# Patient Record
Sex: MALE | Race: WHITE | NOT HISPANIC OR LATINO | Employment: OTHER | URBAN - NONMETROPOLITAN AREA
[De-identification: names, ages, dates, MRNs, and addresses within clinical notes are randomized per-mention and may not be internally consistent; named-entity substitution may affect disease eponyms.]

---

## 2017-02-09 RX ORDER — AMITRIPTYLINE HYDROCHLORIDE 25 MG/1
TABLET, FILM COATED ORAL
Qty: 90 TABLET | Refills: 3 | Status: SHIPPED | OUTPATIENT
Start: 2017-02-09 | End: 2017-04-25

## 2017-04-25 ENCOUNTER — OFFICE VISIT (OUTPATIENT)
Dept: NEUROLOGY | Facility: CLINIC | Age: 42
End: 2017-04-25

## 2017-04-25 VITALS
SYSTOLIC BLOOD PRESSURE: 128 MMHG | WEIGHT: 211 LBS | HEIGHT: 67 IN | BODY MASS INDEX: 33.12 KG/M2 | DIASTOLIC BLOOD PRESSURE: 70 MMHG | OXYGEN SATURATION: 97 % | HEART RATE: 78 BPM

## 2017-04-25 DIAGNOSIS — M54.81 OCCIPITAL NEURALGIA OF LEFT SIDE: ICD-10-CM

## 2017-04-25 DIAGNOSIS — G43.011 INTRACTABLE MIGRAINE WITHOUT AURA AND WITH STATUS MIGRAINOSUS: Primary | ICD-10-CM

## 2017-04-25 PROCEDURE — 99214 OFFICE O/P EST MOD 30 MIN: CPT | Performed by: PSYCHIATRY & NEUROLOGY

## 2017-04-25 RX ORDER — AMITRIPTYLINE HYDROCHLORIDE 100 MG/1
TABLET, FILM COATED ORAL
Qty: 30 TABLET | Refills: 3 | Status: SHIPPED | OUTPATIENT
Start: 2017-04-25 | End: 2017-08-25 | Stop reason: SDUPTHER

## 2017-04-25 RX ORDER — CHLORTHALIDONE 25 MG/1
TABLET ORAL
Refills: 2 | COMMUNITY
Start: 2017-04-03 | End: 2018-05-25 | Stop reason: ALTCHOICE

## 2017-04-25 NOTE — PROGRESS NOTES
"[facility] NEUROLOGY [place] PROGRESS NOTE  History of Present Illness     Date: 4/25/2017    Patient Identification  Crow Watson is a 41 y.o. male.    Patient information was obtained from patient.  History/Exam limitations: none.    Original consultation requested by: Mario Taylor M.D.      Chief Complaint   Migraine (Pt here to follow up on trigger injections, Pt states that the last injections didn't \"absolutely nothing\")      History of Present Illness   Patient is a pleasant 41-year-old gentleman referred to the neurology clinic for evaluation of neck pain and headache.  In today visits patient reports to have recurrent headache.    Patient described the headache is involving the occipital lobe region.  Radiating from his neck following greater occipital nerve.     PMH:   Past Medical History:   Diagnosis Date   • Anxiety    • Atrophy, cortical     white matter disease   • Gout    • Herniated disc, cervical    • Hyperlipidemia    • Hypertension    • Hyperthyroidism    • Lumbar herniated disc    • Memory loss    • Migraine    • Neuropathy    • Tremor        Past Surgical History:   Past Surgical History:   Procedure Laterality Date   • COLONOSCOPY  2002   • SINUS SURGERY  2014   • VASECTOMY      2000       Family Hisotry: No family history on file.    Social History:   Social History     Social History   • Marital status:      Spouse name: N/A   • Number of children: N/A   • Years of education: N/A     Occupational History   • Not on file.     Social History Main Topics   • Smoking status: Not on file   • Smokeless tobacco: Not on file   • Alcohol use Not on file   • Drug use: Not on file   • Sexual activity: Not on file     Other Topics Concern   • Not on file     Social History Narrative   • No narrative on file       Medications:   Current Outpatient Prescriptions   Medication Sig Dispense Refill   • allopurinol (ZYLOPRIM) 300 MG tablet bid     • chlorthalidone (HYGROTON) 25 MG tablet Take one daily "  2   • COLCRYS 0.6 MG tablet Daily.  2   • gabapentin (NEURONTIN) 600 MG tablet Tid     • levothyroxine (SYNTHROID, LEVOTHROID) 50 MCG tablet      • lisinopril (PRINIVIL,ZESTRIL) 20 MG tablet bid     • pantoprazole (PROTONIX) 40 MG EC tablet      • propranolol (INDERAL) 20 MG tablet   2   • QUEtiapine (SEROquel) 200 MG tablet      • sertraline (ZOLOFT) 100 MG tablet tid     • traZODone (DESYREL) 100 MG tablet 2 at bedtime     • amitriptyline (ELAVIL) 100 MG tablet One pill at 5 PM at supper time 30 tablet 3     No current facility-administered medications for this visit.        Allergy:   Allergies   Allergen Reactions   • Morphine And Related Other (See Comments)     Migraine, hypertension   • Ultram [Tramadol Hcl] Nausea And Vomiting   • Zithromax [Azithromycin] Nausea And Vomiting       Review of Systems:  Review of Systems   Constitutional: Negative for chills and fever.   HENT: Negative for congestion, ear pain, hearing loss, rhinorrhea and sore throat.    Eyes: Negative for pain, discharge and redness.   Respiratory: Negative for cough, shortness of breath, wheezing and stridor.    Cardiovascular: Negative for chest pain, palpitations and leg swelling.   Gastrointestinal: Negative for abdominal pain, constipation, nausea and vomiting.   Endocrine: Negative for cold intolerance, heat intolerance and polyphagia.   Genitourinary: Negative for dysuria, flank pain, frequency and urgency.   Musculoskeletal: Positive for neck pain and neck stiffness. Negative for joint swelling and myalgias.   Skin: Negative for pallor, rash and wound.   Allergic/Immunologic: Negative for environmental allergies.   Neurological: Positive for headaches. Negative for dizziness, tremors, seizures, syncope, facial asymmetry, speech difficulty, weakness, light-headedness and numbness.   Hematological: Negative for adenopathy.   Psychiatric/Behavioral: Negative for confusion and hallucinations. The patient is not nervous/anxious.   "      Physical Exam     Vitals:    04/25/17 1139   BP: 128/70   Pulse: 78   SpO2: 97%   Weight: 211 lb (95.7 kg)   Height: 67\" (170.2 cm)     GENERAL: Patient is pleasant, cooperative, appears to be stated age.  Body habitus is endomorphic.  SKIN AND EXTREMITIES:  No skin rashes or lesions are noted.  No cyanosis, clubbing or edema of the extremities.    HEAD:  Head is normocephalic and atraumatic.    NECK: Neck are non-tender without thyromegaly or adenopathy.  Carotic upstrokes are 1+/4.  No cranial or cervical bruits.  The neck is supple with a full range of motion.   ENT: palate elevate symmetrically, no evidence of high arch palate, tongue midline erythema in posterior pharynx, Mallampati Classification Class III   CARDIOVASCULAR:  Regular rate and rhythm with normal S1 and S2 without rub or gallop.  RESPIRATORY:  Clear to auscultation without wheezes or crackle   ABDOMEN:  Soft and non-tender, positive bowel sound without hepatosplenomegaly  BACK:  Back is straight without midline defect.    PSYCH:  Higher cortical function/mental status:  The patient is alert.  She is oriented x3 to time, place and person.  Recent and the remote memory appear normal.  The patient has a good fund of knowledge.  There is no visual or auditory hallucination or suicidal or homicidal ideation.  SPEECH:There is no gross evidence of aphasia, dysarthria or agnosia.      CRANIAL NERVES:  Pupils are 4mm, equal round reactive to light, reacting briskly to 2mm without afferent pupillary defect.  Visual fields are intact to confrontation testing.  Fundoscopic examination reveals sharp disk margins with normal vasculature.  No papilledema, hemorrhages or exudates.  Extraocular movements are full and smooth with normal pursuits and saccades.  No nystagmus noted.  The face is symmetric. palate elevate symmetrically, Tongue midline, positive gag reflex. The remainder of the cranial nerves are intact and symmetrical.    MOTOR: Strength is 5/5 " throughout with normal tone and bulk with the following exceptions, 4/5 intrinsic muscles of the hands and feet.  No involuntary movements noted.    Deep Tendon Reflexes: are 2/4 and symmetrical in the upper extremities, 2/4 and symmetrical at the knees and 1/4 and symmetrical at the Achilles tendon.  Plantar responses were down-going bilaterally.    SENSATION:  Intact to pinprick, light touch, vibration and proprioception.  Coordination:  The patient normally performs finger-nose-finger, heel-to-knee-to-shin and rapid alternating movements in symmetrical fashion.    COORDINATION AND GAIT:  The patient walks with a narrow-based gait.  Patient is able to heel-toe and tandem walk forward and backwards without difficulty.  Romberg and monopedal  Romberg are negative.    MUSCULOSKELETAL: Range of motion normal, no clubbing, cyanosis, or edema.  No joint swelling.            Studies: I have personally reviewed the following and discussed with the patient.  No results found for this or any previous visit.    Review of Imaging: I have personally reviewed the following images and discussed with the patient.  No results found.      Records Reviewed: I have personally reviewed his previous medical record.    Crow was seen today for migraine.    Diagnoses and all orders for this visit:    Intractable migraine without aura and with status migrainosus  -     Inject Trigger Points, > 3    Occipital neuralgia of left side    Other orders  -     amitriptyline (ELAVIL) 100 MG tablet; One pill at 5 PM at supper time        Treatments:  1. Would like to schedule patient to have a trigger point injection  2. I will also schedule this patient to have occipital nerve block  3. With the increased dissipation on amitriptyline from 75 mg to 100 mg at suppertime.      This Document is signed by Ashok Higginbotham MD, FAAN, FAASM   April 25, 20177:01 PM

## 2017-05-08 RX ORDER — AMITRIPTYLINE HYDROCHLORIDE 25 MG/1
TABLET, FILM COATED ORAL
Qty: 90 TABLET | Refills: 3 | OUTPATIENT
Start: 2017-05-08

## 2017-06-19 ENCOUNTER — PROCEDURE VISIT (OUTPATIENT)
Dept: NEUROLOGY | Facility: CLINIC | Age: 42
End: 2017-06-19

## 2017-06-19 VITALS
WEIGHT: 222.4 LBS | HEART RATE: 82 BPM | OXYGEN SATURATION: 98 % | HEIGHT: 67 IN | SYSTOLIC BLOOD PRESSURE: 128 MMHG | DIASTOLIC BLOOD PRESSURE: 86 MMHG | BODY MASS INDEX: 34.91 KG/M2

## 2017-06-19 DIAGNOSIS — M54.2 NECK PAIN: ICD-10-CM

## 2017-06-19 DIAGNOSIS — G44.209 TENSION HEADACHE: ICD-10-CM

## 2017-06-19 DIAGNOSIS — M79.18 MYOFASCIAL MUSCLE PAIN: Primary | ICD-10-CM

## 2017-06-19 PROCEDURE — 20553 NJX 1/MLT TRIGGER POINTS 3/>: CPT | Performed by: PSYCHIATRY & NEUROLOGY

## 2017-06-19 RX ORDER — METHYLPREDNISOLONE ACETATE 40 MG/ML
200 INJECTION, SUSPENSION INTRA-ARTICULAR; INTRALESIONAL; INTRAMUSCULAR; SOFT TISSUE ONCE
Status: COMPLETED | OUTPATIENT
Start: 2017-06-20 | End: 2017-06-19

## 2017-06-19 RX ORDER — AMLODIPINE BESYLATE 10 MG/1
TABLET ORAL
Refills: 3 | COMMUNITY
Start: 2017-05-18 | End: 2018-05-25 | Stop reason: DRUGHIGH

## 2017-06-19 RX ORDER — BUPIVACAINE HYDROCHLORIDE 5 MG/ML
5 INJECTION, SOLUTION EPIDURAL; INTRACAUDAL ONCE
Status: SHIPPED | OUTPATIENT
Start: 2017-06-20

## 2017-06-19 RX ADMIN — METHYLPREDNISOLONE ACETATE 200 MG: 40 INJECTION, SUSPENSION INTRA-ARTICULAR; INTRALESIONAL; INTRAMUSCULAR; SOFT TISSUE at 23:27

## 2017-06-20 NOTE — PROGRESS NOTES
Procedure note    Procedure: Trigger point injection    Indication: Patient is delightful 41-year-old gentleman with persistent migraine headache 22 day out of 30 days despite taking amitriptyline 100 mg 1 pill a day along with Neurontin 600 mg 3 times a day and propranolol 20 mg.    Procedure  Patient is suffering from headache and myofacial pain syndrome. Risks and benefits of the Trigger point injection procedure have been explained to the patient.  Patient has no contraindications such as bleed diathesis, recent acute trauma at the muscle sites, anesthetic allergy or anticoagulation.  Mechanism of trigger point injection has been explained to patient.     Procedure Note:  1.         Patient was positioned comfortably  2.         Before injections are started, 10 Trigger Points sites are identified throughout the bilateral upper trapezius muscle, levator scapulae, and erector spinae muscles.    3.         Overlying skin area was cleaned with Alcohol swab                                                                                                              4.         Before injection, trigger points sites were palpated for local twitch and referred pain to confirms placement                         5.         Local anesthetic was mixed with Depo-Medrol (5 cc of Marcaine 0.5% mixed with 5 cc of Depo-Medrol at 40 mg/ml - for a total of 10 cc)  6.         30 gauge ½ inch needle was utilized to ensure patient comfort          7.         I started with the most tender spot in above mentioned Trigger Points   1.   Localize most tender spot within taut muscle-fibers  2.   Fix tender spot between fingers (1-2 cm in size)   1.   Prevents from rolling away from needle  2.   Controls subcutaneous bleeding  3.   Before injection, patient was instructed of possible pain on injection  4.   Direct needle at 30 degree angle off skin   8.         Insert needle into skin 1-2 cm from Trigger Point   9.         Advance needle  into Trigger Point   10.       Use 1cc anesthetic at each Trigger Points identified in step #2  11.       Redirect needle and reinject                                                                                              1.   Withdraw needle to subcutaneous tissue  2.   Redirect needle into adjacent tender areas  3.   Repeat until local twitch or tautness resolves  12.   After each of the 10 injections I held direct pressure at injection site for 2 minutes to prevents hematoma formation  13.   The same procedure was repeated for other Tender Points located in the upper trapezius muscle, levator scapulae and erector spinae bilaterally  14.   Patient was instructed to gently stretch injected areas to full active range of motion in all directions and was instructed to repeat range of motion three times after injection  15.   Post-Procedure patient was instructed to avoid over-using injected area for 3-4 days   1.   Maintain active range of motion of injected muscle  2.   Patient applies ice to injected areas for a few hours  3.   Anticipate post-injection soreness for 3-4 days  There was no evidence of complications from injection was noted such as local Skin Infection  at injection site or local hematoma at injection site      Marcaine lots #[id number]  Expiration date January 1, 2019    Depo-Medrol lots numberS [id number]  Expiration date November 2019  Ashok Higginbotham MD, FAAN  06/19/2017

## 2017-08-25 RX ORDER — AMITRIPTYLINE HYDROCHLORIDE 100 MG/1
TABLET, FILM COATED ORAL
Qty: 30 TABLET | Refills: 0 | Status: SHIPPED | OUTPATIENT
Start: 2017-08-25 | End: 2017-09-25 | Stop reason: SDUPTHER

## 2017-09-25 ENCOUNTER — PROCEDURE VISIT (OUTPATIENT)
Dept: NEUROLOGY | Facility: CLINIC | Age: 42
End: 2017-09-25

## 2017-09-25 VITALS
HEIGHT: 67 IN | WEIGHT: 214 LBS | HEART RATE: 83 BPM | SYSTOLIC BLOOD PRESSURE: 142 MMHG | BODY MASS INDEX: 33.59 KG/M2 | DIASTOLIC BLOOD PRESSURE: 88 MMHG | OXYGEN SATURATION: 97 %

## 2017-09-25 DIAGNOSIS — M54.2 NECK PAIN: Primary | ICD-10-CM

## 2017-09-25 DIAGNOSIS — M79.18 MYOFASCIAL MUSCLE PAIN: ICD-10-CM

## 2017-09-25 DIAGNOSIS — G44.209 TENSION HEADACHE: ICD-10-CM

## 2017-09-25 PROCEDURE — 20553 NJX 1/MLT TRIGGER POINTS 3/>: CPT | Performed by: PSYCHIATRY & NEUROLOGY

## 2017-09-25 RX ORDER — PROMETHAZINE HYDROCHLORIDE 25 MG/1
TABLET ORAL
COMMUNITY
Start: 2017-08-31

## 2017-09-25 RX ORDER — METHYLPREDNISOLONE ACETATE 40 MG/ML
200 INJECTION, SUSPENSION INTRA-ARTICULAR; INTRALESIONAL; INTRAMUSCULAR; SOFT TISSUE ONCE
Status: COMPLETED | OUTPATIENT
Start: 2017-09-25 | End: 2017-09-28

## 2017-09-25 RX ORDER — AMITRIPTYLINE HYDROCHLORIDE 100 MG/1
100 TABLET, FILM COATED ORAL NIGHTLY
Qty: 30 TABLET | Refills: 3 | Status: SHIPPED | OUTPATIENT
Start: 2017-09-25 | End: 2018-05-25 | Stop reason: ALTCHOICE

## 2017-09-25 RX ORDER — BUPIVACAINE HYDROCHLORIDE 7.5 MG/ML
5 INJECTION, SOLUTION EPIDURAL; RETROBULBAR ONCE
Status: SHIPPED | OUTPATIENT
Start: 2017-09-25

## 2017-09-28 RX ADMIN — METHYLPREDNISOLONE ACETATE 200 MG: 40 INJECTION, SUSPENSION INTRA-ARTICULAR; INTRALESIONAL; INTRAMUSCULAR; SOFT TISSUE at 23:08

## 2017-09-29 NOTE — PROGRESS NOTES
Procedure note    Procedure: Trigger point injection    Indication: Persistent neck pain and headache and myofascial pain despite Elavil and Neurontin, Inderal.    Procedure note:  Patient is suffering from headache and myofacial pain syndrome. Risks and benefits of the Trigger point injection procedure have been explained to the patient.  Patient has no contraindications such as bleed diathesis, recent acute trauma at the muscle sites, anesthetic allergy or anticoagulation.  Mechanism of trigger point injection has been explained to patient.     Procedure Note:  1.         Patient was positioned comfortably  2.         Before injections are started, 10 Trigger Points sites are identified throughout the bilateral upper trapezius muscle, levator scapulae, and erector spinae muscles.    3.         Overlying skin area was cleaned with Alcohol swab                                                                                                              4.         Before injection, trigger points sites were palpated for local twitch and referred pain to confirms placement                         5.         Local anesthetic was mixed with Depo-Medrol (5 cc of Marcaine 0.5% mixed with 5 cc of Depo-Medrol at 40 mg/ml - for a total of 10 cc)  6.         30 gauge ½ inch needle was utilized to ensure patient comfort          7.         I started with the most tender spot in above mentioned Trigger Points   1.   Localize most tender spot within taut muscle-fibers  2.   Fix tender spot between fingers (1-2 cm in size)   1.   Prevents from rolling away from needle  2.   Controls subcutaneous bleeding  3.   Before injection, patient was instructed of possible pain on injection  4.   Direct needle at 30 degree angle off skin   8.         Insert needle into skin 1-2 cm from Trigger Point   9.         Advance needle into Trigger Point   10.       Use 1cc anesthetic at each Trigger Points identified in step #2  11.       Redirect  needle and reinject                                                                                              1.   Withdraw needle to subcutaneous tissue  2.   Redirect needle into adjacent tender areas  3.   Repeat until local twitch or tautness resolves  12.   After each of the 10 injections I held direct pressure at injection site for 2 minutes to prevents hematoma formation  13.   The same procedure was repeated for other Tender Points located in the upper trapezius muscle, levator scapulae and erector spinae bilaterally  14.   Patient was instructed to gently stretch injected areas to full active range of motion in all directions and was instructed to repeat range of motion three times after injection  15.   Post-Procedure patient was instructed to avoid over-using injected area for 3-4 days   1.   Maintain active range of motion of injected muscle  2.   Patient applies ice to injected areas for a few hours  3.   Anticipate post-injection soreness for 3-4 days  There was no evidence of complications from injection was noted such as local Skin Infection  at injection site or local hematoma at injection site      Ashok Higginbotham MD, FAAN  09/25/2017

## 2018-05-25 ENCOUNTER — OFFICE VISIT (OUTPATIENT)
Dept: NEUROLOGY | Facility: CLINIC | Age: 43
End: 2018-05-25

## 2018-05-25 VITALS
HEIGHT: 67 IN | DIASTOLIC BLOOD PRESSURE: 82 MMHG | OXYGEN SATURATION: 99 % | SYSTOLIC BLOOD PRESSURE: 128 MMHG | HEART RATE: 77 BPM

## 2018-05-25 DIAGNOSIS — G43.711 INTRACTABLE CHRONIC MIGRAINE WITHOUT AURA AND WITH STATUS MIGRAINOSUS: Primary | ICD-10-CM

## 2018-05-25 PROCEDURE — 99214 OFFICE O/P EST MOD 30 MIN: CPT | Performed by: PSYCHIATRY & NEUROLOGY

## 2018-05-25 RX ORDER — AMLODIPINE BESYLATE 5 MG/1
1 TABLET ORAL DAILY
Refills: 5 | COMMUNITY
Start: 2018-04-10

## 2018-05-25 RX ORDER — HYDROCHLOROTHIAZIDE 12.5 MG/1
1 TABLET ORAL DAILY
Refills: 2 | COMMUNITY
Start: 2018-04-10

## 2018-05-25 RX ORDER — AMITRIPTYLINE HYDROCHLORIDE 50 MG/1
1 TABLET, FILM COATED ORAL DAILY
Refills: 1 | COMMUNITY
Start: 2018-04-10

## 2018-05-25 NOTE — PROGRESS NOTES
"[facility] NEUROLOGY [place] PROGRESS NOTE  History of Present Illness     Date: 5/25/2018    Patient Identification  Crow Watson is a 43 y.o. male.    Patient information was obtained from patient.  History/Exam limitations: none.    Original consultation requested by: Mario Maria MD      Chief Complaint   Migraine (Pt in office today to discuss migraines, states sx are \"same as before\") and Med Refill      History of Present Illness   Patient is a pleasant 43-year-old referred to [facility] neurology [place] for evaluation of migraine headache.  In today visit patient reported that he continued to experience excruciating migraine headache despite trigger point injection and conservative medical management.  Patient has tried numerous prophylactic treatment with Elavil and Inderal and Phenergan without any significant improvement.  Patient still continued to have headache 30 out of 30 days.  And his headache last at least 4 hours at the time.  We have also discussed other treatment option including Botox injection and CGRP inhibitor.    PMH:   Past Medical History:   Diagnosis Date   • Anxiety    • Atrophy, cortical     white matter disease   • Gout    • Herniated disc, cervical    • Hyperlipidemia    • Hypertension    • Hyperthyroidism    • Lumbar herniated disc    • Memory loss    • Migraine    • Neuropathy    • Tremor        Past Surgical History:   Past Surgical History:   Procedure Laterality Date   • COLONOSCOPY  2002   • SINUS SURGERY  2014   • VASECTOMY      2000       Family Hisotry: No family history on file.    Social History:   Social History     Social History   • Marital status:      Spouse name: N/A   • Number of children: N/A   • Years of education: N/A     Occupational History   • Not on file.     Social History Main Topics   • Smoking status: Not on file   • Smokeless tobacco: Not on file   • Alcohol use Not on file   • Drug use: Unknown   • Sexual activity: Not on file "     Other Topics Concern   • Not on file     Social History Narrative   • No narrative on file       Medications:   Current Outpatient Prescriptions   Medication Sig Dispense Refill   • allopurinol (ZYLOPRIM) 300 MG tablet bid     • amitriptyline (ELAVIL) 50 MG tablet 1 tablet Daily.  1   • amLODIPine (NORVASC) 5 MG tablet 1 tablet Daily.  5   • COLCRYS 0.6 MG tablet Daily.  2   • hydrochlorothiazide (HYDRODIURIL) 12.5 MG tablet 1 tablet Daily.  2   • levothyroxine (SYNTHROID, LEVOTHROID) 50 MCG tablet      • lisinopril (PRINIVIL,ZESTRIL) 20 MG tablet bid     • pantoprazole (PROTONIX) 40 MG EC tablet      • promethazine (PHENERGAN) 25 MG tablet      • propranolol (INDERAL) 20 MG tablet   2   • QUEtiapine (SEROquel) 200 MG tablet      • sertraline (ZOLOFT) 100 MG tablet tid     • traZODone (DESYREL) 100 MG tablet 2 at bedtime       Current Facility-Administered Medications   Medication Dose Route Frequency Provider Last Rate Last Dose   • bupivacaine (PF) (MARCAINE) 0.5 % injection 5 mL  5 mL Injection Once Ahsok Higginbotham MD, FAAN       • bupivacaine PF (MARCAINE) 0.75 % injection 37.5 mg  5 mL Injection Once Ashok Higginbotham MD, FAAN       • OnabotulinumtoxinA 200 Units  200 Units Intramuscular Once Ashok Higginbotham MD, FAAN           Allergy:   Allergies   Allergen Reactions   • Morphine And Related Other (See Comments)     Migraine, hypertension   • Ultram [Tramadol Hcl] Nausea And Vomiting   • Zithromax [Azithromycin] Nausea And Vomiting       Review of Systems:  Review of Systems   Constitutional: Negative for chills and fever.   HENT: Negative for congestion, ear pain, hearing loss, rhinorrhea and sore throat.    Eyes: Negative for pain, discharge and redness.   Respiratory: Negative for cough, shortness of breath, wheezing and stridor.    Cardiovascular: Negative for chest pain, palpitations and leg swelling.   Gastrointestinal: Negative for abdominal pain, constipation, nausea and vomiting.   Endocrine: Negative for  "cold intolerance, heat intolerance and polyphagia.   Genitourinary: Negative for dysuria, flank pain, frequency and urgency.   Musculoskeletal: Negative for joint swelling, myalgias, neck pain and neck stiffness.   Skin: Negative for pallor, rash and wound.   Allergic/Immunologic: Negative for environmental allergies.   Neurological: Positive for headaches. Negative for dizziness, tremors, seizures, syncope, facial asymmetry, speech difficulty, weakness, light-headedness and numbness.   Hematological: Negative for adenopathy.   Psychiatric/Behavioral: Positive for sleep disturbance. Negative for confusion and hallucinations. The patient is not nervous/anxious.        Physical Exam     Vitals:    05/25/18 1506   BP: 128/82   Pulse: 77   SpO2: 99%   Height: 170.2 cm (67\")     GENERAL: Patient is pleasant, cooperative, appears to be stated age.  Body habitus is endomorphic.  SKIN AND EXTREMITIES:  No skin rashes or lesions are noted.  No cyanosis, clubbing or edema of the extremities.    HEAD:  Head is normocephalic and atraumatic.    NECK: Neck are non-tender without thyromegaly or adenopathy.  Carotic upstrokes are 1+/4.  No cranial or cervical bruits.  The neck is supple with a full range of motion.   ENT: palate elevate symmetrically, no evidence of high arch palate, tongue midline erythema in posterior pharynx, Mallampati Classification Class III   CARDIOVASCULAR:  Regular rate and rhythm with normal S1 and S2 without rub or gallop.  RESPIRATORY:  Clear to auscultation without wheezes or crackle   ABDOMEN:  Soft and non-tender, positive bowel sound without hepatosplenomegaly  BACK:  Back is straight without midline defect.    PSYCH:  Higher cortical function/mental status:  The patient is alert.  She is oriented x3 to time, place and person.  Recent and the remote memory appear normal.  The patient has a good fund of knowledge.  There is no visual or auditory hallucination or suicidal or homicidal " ideation.  SPEECH:There is no gross evidence of aphasia, dysarthria or agnosia.      CRANIAL NERVES:  Pupils are 4mm, equal round reactive to light, reacting briskly to 2mm without afferent pupillary defect.  Visual fields are intact to confrontation testing.  Fundoscopic examination reveals sharp disk margins with normal vasculature.  No papilledema, hemorrhages or exudates.  Extraocular movements are full and smooth with normal pursuits and saccades.  No nystagmus noted.  The face is symmetric. palate elevate symmetrically, Tongue midline, positive gag reflex. The remainder of the cranial nerves are intact and symmetrical.    MOTOR: Strength is 5/5 throughout with normal tone and bulk with the following exceptions, 4/5 intrinsic muscles of the hands and feet.  No involuntary movements noted.    Deep Tendon Reflexes: are 2/4 and symmetrical in the upper extremities, 2/4 and symmetrical at the knees and 1/4 and symmetrical at the Achilles tendon.  Plantar responses were down-going bilaterally.    SENSATION:  Intact to pinprick, light touch, vibration and proprioception.  Coordination:  The patient normally performs finger-nose-finger, heel-to-knee-to-shin and rapid alternating movements in symmetrical fashion.    COORDINATION AND GAIT:  The patient walks with a narrow-based gait.  Patient is able to heel-toe and tandem walk forward and backwards without difficulty.  Romberg and monopedal  Romberg are negative.    MUSCULOSKELETAL: Range of motion normal, no clubbing, cyanosis, or edema.  No joint swelling.            Records Reviewed: I have personally reviewed his previous medical record.    Crow was seen today for migraine and med refill.    Diagnoses and all orders for this visit:    Intractable chronic migraine without aura and with status migrainosus  -     OnabotulinumtoxinA 200 Units; Inject 200 Units into the shoulder, thigh, or buttocks 1 (One) Time.        Treatments:  1.  Since patient has failed  "conservative medical management and he has also failed trigger point injection patient continued to have headache 30 out of 30 days lasting for more than 4 hours at a time may consider Botox injection.  Discussion:  Migraine Headache  Migraine headaches are a major public health problem affecting more than 28 million persons in this country. Nearly 25 percent of women and 9 percent of men experience disabling migraines.    Migraine treatment depends on the duration and severity of pain, associated symptoms, degree of disability, and initial response to therapy.  A widely prescribed and effective class of medications for migraines is the 5-HT1 receptor-specific agonists (\"triptans\").  Contraindications to their use include ischemic vascular conditions, vasospastic coronary disease, uncontrolled hypertension, or other significant cardiovascular disease.  Following appropriate management of acute migraine, patients should be evaluated for initiation of preventive therapy. Factors that should prompt consideration of preventive therapy include the occurrence of two or more migraines per month with disability lasting three or more days per month; failure of, contraindication for, or adverse events from acute treatments; use of abortive medication more than twice per week; and uncommon migraine conditions (e.g., hemiplegic migraine, migraine with prolonged aura, migrainous infarction). Patient preference and cost also should be considered.  Evidence consistently supports the use of the beta blocker propranolol (Inderal) in migraine prophylaxis. Amitriptyline is a first-line agent for migraine prophylaxis and is the only antidepressant with consistent evidence supporting its effectiveness for this use. Divalproex (Depakote) and sodium valproate are well supported by evidence for use in migraine prevention.  Topamax has also been studies for migraine prophylaxis  in open label studies and double blind studies. Evidence does " not support the use of diltiazem (Cardizem) in migraine prevention, and the evidence for several other calcium channel blockers, such as nifedipine (Procardia), is poor and suggests only modest effect  Menstrual Migraine can present a challenge to clinician.  Estrogen withdrawal has been shown to precipitate migraine headaches, and a sustained elevated level of estrogen will postpone the migraine. Transdermal estrogen started just before menstruation can provide a sustained low level of estrogen, decreasing the degree of estrogen decline, and thus may prevent induction of migraines    This Document is signed by Ashok Higginbotham MD, FAAN, FAASM   May 25, 01046:36 PM

## 2024-02-21 RX ORDER — LABETALOL 200 MG/1
200 TABLET, FILM COATED ORAL 3 TIMES DAILY
COMMUNITY

## 2024-02-21 RX ORDER — NAPROXEN 500 MG/1
500 TABLET ORAL 2 TIMES DAILY WITH MEALS
COMMUNITY

## 2024-02-21 RX ORDER — VALSARTAN 80 MG/1
80 TABLET ORAL DAILY
COMMUNITY

## 2024-02-21 RX ORDER — MONTELUKAST SODIUM 10 MG/1
10 TABLET ORAL NIGHTLY
COMMUNITY

## 2024-02-21 RX ORDER — OMEPRAZOLE 20 MG/1
20 CAPSULE, DELAYED RELEASE ORAL DAILY
COMMUNITY

## 2024-02-27 ENCOUNTER — OFFICE VISIT (OUTPATIENT)
Dept: BEHAVIORAL HEALTH | Facility: CLINIC | Age: 49
End: 2024-02-27
Payer: MEDICARE

## 2024-02-27 ENCOUNTER — OFFICE VISIT (OUTPATIENT)
Dept: BARIATRICS/WEIGHT MGMT | Facility: CLINIC | Age: 49
End: 2024-02-27
Payer: MEDICARE

## 2024-02-27 VITALS
TEMPERATURE: 97.8 F | OXYGEN SATURATION: 98 % | SYSTOLIC BLOOD PRESSURE: 130 MMHG | BODY MASS INDEX: 35.16 KG/M2 | WEIGHT: 224 LBS | DIASTOLIC BLOOD PRESSURE: 74 MMHG | HEIGHT: 67 IN | HEART RATE: 83 BPM

## 2024-02-27 DIAGNOSIS — F41.8 ANXIETY ABOUT HEALTH: ICD-10-CM

## 2024-02-27 DIAGNOSIS — I10 HYPERTENSION, UNSPECIFIED TYPE: ICD-10-CM

## 2024-02-27 DIAGNOSIS — R12 HEARTBURN: ICD-10-CM

## 2024-02-27 DIAGNOSIS — E66.9 OBESITY (BMI 35.0-39.9 WITHOUT COMORBIDITY): Primary | ICD-10-CM

## 2024-02-27 DIAGNOSIS — R53.83 FATIGUE, UNSPECIFIED TYPE: ICD-10-CM

## 2024-02-27 DIAGNOSIS — R06.09 DOE (DYSPNEA ON EXERTION): ICD-10-CM

## 2024-02-27 DIAGNOSIS — E66.9 OBESITY, CLASS II, BMI 35-39.9: Primary | ICD-10-CM

## 2024-02-27 DIAGNOSIS — R73.03 PREDIABETES: ICD-10-CM

## 2024-02-27 DIAGNOSIS — Z71.89 ENCOUNTER FOR PSYCHOLOGICAL ASSESSMENT PRIOR TO BARIATRIC SURGERY: ICD-10-CM

## 2024-02-27 PROBLEM — M54.9 BACK PAIN: Status: ACTIVE | Noted: 2024-02-27

## 2024-02-27 PROBLEM — F41.9 ANXIETY: Status: ACTIVE | Noted: 2024-02-27

## 2024-02-27 PROBLEM — E78.5 HYPERLIPIDEMIA: Status: ACTIVE | Noted: 2024-02-27

## 2024-02-27 PROBLEM — M10.9 GOUT: Status: ACTIVE | Noted: 2024-02-27

## 2024-02-27 PROBLEM — G43.909 MIGRAINE: Status: ACTIVE | Noted: 2024-02-27

## 2024-02-27 PROBLEM — E03.9 HYPOTHYROIDISM: Status: ACTIVE | Noted: 2024-02-27

## 2024-02-27 PROBLEM — R25.1 TREMOR: Status: ACTIVE | Noted: 2024-02-27

## 2024-02-27 PROCEDURE — 3078F DIAST BP <80 MM HG: CPT | Performed by: PHYSICIAN ASSISTANT

## 2024-02-27 PROCEDURE — 99204 OFFICE O/P NEW MOD 45 MIN: CPT | Performed by: PHYSICIAN ASSISTANT

## 2024-02-27 PROCEDURE — 1159F MED LIST DOCD IN RCRD: CPT | Performed by: PHYSICIAN ASSISTANT

## 2024-02-27 PROCEDURE — 1159F MED LIST DOCD IN RCRD: CPT | Performed by: PSYCHOLOGIST

## 2024-02-27 PROCEDURE — 3075F SYST BP GE 130 - 139MM HG: CPT | Performed by: PHYSICIAN ASSISTANT

## 2024-02-27 PROCEDURE — 90791 PSYCH DIAGNOSTIC EVALUATION: CPT | Performed by: PSYCHOLOGIST

## 2024-02-27 PROCEDURE — 1160F RVW MEDS BY RX/DR IN RCRD: CPT | Performed by: PSYCHOLOGIST

## 2024-02-27 PROCEDURE — 1160F RVW MEDS BY RX/DR IN RCRD: CPT | Performed by: PHYSICIAN ASSISTANT

## 2024-02-27 NOTE — PROGRESS NOTES
[facility]  [address]  [address]  [facility] [address]  [phone]      Patient  Name:  Crow Watson  :  1975      Date of Visit: 2024      Chief Complaint:  weight gain; unable to maintain weight loss      History of Present Illness:  Crow Watson is a 48 y.o. male who presents today for evaluation, education and consultation regarding metabolic and bariatric surgery with Dr. Patel.     Crow has been overweight for at least 30 years, has been 35 pounds or more overweight for at least 20 years,  and started dieting in his late 30s..  Previous diet attempts include: High Protein, Low Carbohydrate, Low Fat, Fasting, and Slim Fast; Adipex, Wellbutrin; None.  The most weight Crow lost was 10 pounds but was unable to maintain that weight loss.  His maximum lifetime weight is 230 pounds.      GI: Heartburn treated with daily Prilosec.  He denies any prior evaluation or history of H. pylori.  He denies any postprandial nausea, abdominal pain, bloating.    Other past medical history significant for hypertension, hyperlipidemia, dyspnea on exertion, prediabetes, hypothyroidism, gout, back pain, benign essential tremor, migraines, anxiety.     Complete history has been obtained and discussed today, as pertinent to metabolic/ bariatric surgery.     Past Medical History:   Diagnosis Date    Anxiety     Atrophy, cortical     white matter disease    Back pain     Naproxen. No steroids    FORD (dyspnea on exertion)     Gout     allopurinol    Heartburn     Omeprazole daily. No hx of h pylori or prior EGD.    Herniated disc, cervical     Hyperlipidemia     Hypertension     Hypothyroidism     synthroid    Kidney stone     Lumbar herniated disc     Memory loss     Migraine     Neuropathy     Prediabetes     Tremor     Wears glasses      Past Surgical History:   Procedure Laterality Date    COLONOSCOPY  2002    SINUS SURGERY  2014    VASECTOMY      2000       Allergies    Allergen Reactions    Morphine And Related Other (See Comments)     Migraine, hypertension    Ultram [Tramadol Hcl] Nausea And Vomiting    Zithromax [Azithromycin] Nausea And Vomiting       Current Outpatient Medications:     allopurinol (ZYLOPRIM) 300 MG tablet, bid, Disp: , Rfl:     amitriptyline (ELAVIL) 50 MG tablet, 1 tablet Daily., Disp: , Rfl: 1    amLODIPine (NORVASC) 5 MG tablet, 1 tablet Daily., Disp: , Rfl: 5    hydrochlorothiazide (HYDRODIURIL) 12.5 MG tablet, 1 tablet Daily., Disp: , Rfl: 2    labetalol (NORMODYNE) 200 MG tablet, Take 1 tablet by mouth 3 (Three) Times a Day., Disp: , Rfl:     levothyroxine (SYNTHROID, LEVOTHROID) 50 MCG tablet, , Disp: , Rfl:     naproxen (NAPROSYN) 500 MG tablet, Take 1 tablet by mouth 2 (Two) Times a Day With Meals., Disp: , Rfl:     omeprazole (priLOSEC) 20 MG capsule, Take 1 capsule by mouth Daily., Disp: , Rfl:     QUEtiapine (SEROquel) 200 MG tablet, , Disp: , Rfl:     sertraline (ZOLOFT) 100 MG tablet, tid, Disp: , Rfl:     traZODone (DESYREL) 100 MG tablet, 2 at bedtime, Disp: , Rfl:     valsartan (DIOVAN) 80 MG tablet, Take 1 tablet by mouth Daily., Disp: , Rfl:   No current facility-administered medications for this visit.    Social History     Socioeconomic History    Marital status:    Tobacco Use    Smoking status: Former     Years: 30     Types: Cigarettes     Quit date:      Years since quittin.1     Passive exposure: Past    Smokeless tobacco: Never   Vaping Use    Vaping Use: Every day   Substance and Sexual Activity    Alcohol use: Never    Drug use: Never     Social History     Social History Narrative    PT lives in [place], [place], and is , and is disabled       Family History   Problem Relation Age of Onset    Heart disease Mother     Hypertension Mother     Obesity Mother     Sleep apnea Mother     Sleep apnea Father     Heart disease Father     Hypertension Father     Diabetes Father     Obesity Father     Obesity  Sister     Heart disease Sister     Heart disease Maternal Grandmother     Hypertension Maternal Grandmother     Diabetes Maternal Grandmother     Obesity Maternal Grandmother        Review of Systems:  Constitutional:  reports fatigue, weight gain and denies fevers, chills.  HEENT:  denies headache, ear pain or loss of hearing, blurred or double vision, nasal discharge or sore throat.  Cardiovascular:  reports HTN, HLD and denies CAD, Atrial Fib, hx heart disease, heart murmur, hx MI, chest pain, palpitations, edema, hx DVT.  Respiratory:  reports dyspnea on exertion and denies shortness of breath , cough , wheezing, sleep apnea, asthma, COPD, hx PE.  Gastrointestinal:  reports heartburn and denies dysphagia, nausea, vomiting, abdominal pain, IBS, diarrhea, constipation, melena, blood in stool, gallbladder issues, liver disease, hx pancreatitis.  Genitourinary:  reports none and denies history of  frequent UTI, incontinence, hematuria, dysuria, polyuria, polydipsia, renal insufficiency, renal failure.    Musculoskeletal:  reports joint pain, back pain and denies fibromyalgia and autoimmune disease.  Neurological:  reports migraines, benign tremor and denies headaches, numbness /tingling, dizziness, confusion, seizure, stroke.  Psychiatric:  reports hx anxiety and denies depressed mood, hx depression, feeling anxious, bipolar disorder, suicidal ideation, hx suicide attempt, hx self injury, hx substance abuse, eating disorder.  Endocrine:  reports prediabetes, gout and thyroid disease.  Hematologic:  reports none and denies bruising, bleeding disorder, hx anemia, hx blood transfusion.  Skin:  reports none and denies rashes, hx MRSA.    Physical Exam:  Vital Signs:  Weight: 102 kg (224 lb)   Body mass index is 35.61 kg/m².  Temp: 97.8 °F (36.6 °C)   Heart Rate: 83   BP: 130/74     Physical Exam  Constitutional:       General: He is not in acute distress.     Appearance: He is obese.   HENT:      Head: Normocephalic  and atraumatic.   Eyes:      Extraocular Movements: Extraocular movements intact.      Pupils: Pupils are equal, round, and reactive to light.   Cardiovascular:      Rate and Rhythm: Normal rate and regular rhythm.   Pulmonary:      Effort: Pulmonary effort is normal.      Breath sounds: Normal breath sounds.   Abdominal:      General: Bowel sounds are normal. There is no distension.      Palpations: Abdomen is soft. There is no mass.      Tenderness: There is no abdominal tenderness.   Musculoskeletal:      Cervical back: Normal range of motion and neck supple.   Skin:     General: Skin is warm and dry.   Neurological:      Mental Status: He is alert and oriented to person, place, and time.      Comments: tremor   Psychiatric:         Mood and Affect: Mood normal.         Behavior: Behavior normal.         Thought Content: Thought content normal.         Judgment: Judgment normal.         Patient Active Problem List   Diagnosis    Hypertension    Hyperlipidemia    Back pain    Heartburn    Hypothyroidism    Gout    FORD (dyspnea on exertion)    Anxiety    Migraine    Prediabetes    Tremor       Assessment:  48 y.o. male with medically complicated obesity pursuing sleeve gastrectomy.    Metabolic & Bariatric Surgery is deemed medically necessary given the following: Class 2 Severe Obesity (BMI >=35 and <=39.9). Obesity-related health conditions include the following: hypertension, dyslipidemias, GERD, and prediabetes, hypothyroidism, gout, back pain, migraines, anxiety . Obesity is improving with treatment. BMI is is above average; BMI management plan is completed. We discussed consulting a Bariatric surgeon.        Plan:  Further evaluation will include: CBC, CMP, Lipids, TSH, HgA1C, H.Pylori UBT, Pulmonary Function Testing, and EGD.    Additional clearances needed prior to surgery will include: Cardiology.     Patient understands that bariatric surgery is not cosmetic surgery but rather a tool to help make a  lifelong commitment to lifestyle changes including diet, exercise and behavior modifications.  The patient has been educated today on those expected postoperative lifestyle changes.  Psychological and Nutritional consultations will be completed prior to surgery.  Instructions on how to access [employer] (an internet based site w/ educational surgical videos) were given to the patient.  Recommended perioperative vitamin supplementation was reviewed.  The importance of avoiding ASA/ NSAIDS/ steroids/ tobacco/nicotine/ hormones/ immunomodulators perioperatively was discussed in detail.  All questions/concerns have been addressed.      Further input to follow pending the above.           JACLYN Ching

## 2024-02-27 NOTE — PROGRESS NOTES
PROGRESS NOTE    Data:    Crow Watson is a 48 y.o. male who met with the undersigned for a scheduled psychological evaluation from 1:45 - 2:30 pm.      Clinical Maneuvering/Intervention:      Chief complaint and history of presenting illness/Problems: struggling with obesity for several years. Despite trying different weight loss plans and diets, the pt reported being unsuccessful in losing weight. A psychological evaluation was conducted in order to assess past and current level of functioning. Areas assessed included, but were not limited to: perception of social support, perception of ability to face and deal with challenges in life (positive functioning), anxiety symptoms, depressive symptoms, perspective on beliefs/belief system, coping skills for stress, intelligence level, addiction issues, etc. Therapeutic rapport was established. Interventions conducted today were geared towards assessing the pt's readiness for weight loss surgery and identifying and psychological contraindications for undergoing such a major life change. Social support was deemed strong (specific to weight loss surgery/weight loss in this manner and in a general sense): wife, friends, and children. Current psychological struggles were described as low, but included: anxiety about health, mainly being overweight and irritability at times. Coping skills for distress and related to undergoing a major life change such as weight loss surgery/weight loss were deemed strong and included good sense of humor, follows directions well, responsible person, maintains quality relationships with others, and believes in himself that he will be successful with weight loss surgery. The pt endorsed having characteristics of readiness to undergo major life changes inherent in the journey of weight loss surgery. The pt could speak to the detailed process of becoming ready mentally for this major life change, having 'suffered' enough, and how the pt has  started incorporating healthier foods into his diet. The pt expressed gratitude for today's visit.     Past Family and Social History:      History of family mental health problems: none endorsed    Psychosocial history: treatment of psychiatric care in the past (N/A), alcohol/substance abuse treatment in the past (N/A) , alcohol/substance abuse problems (N/A), inpatient psychiatric care (N/A).    Mental Status Exam (MSE):  Hygiene:  good  Dress: normal  Attitude:  cooperative and proactive  Motor Activity: normal  Speech: normal  Mood:   slightly nervous  Affect:  congruent  Thought Processes: normal  Thought Content:  normal  Suicidal Thoughts:  not endorsed  Homicidal Thoughts:  not endorsed  Crisis Safety Plan: not needed   Hallucinations:  none      Patient's Support Network Includes:  family, friends      Progress toward goal: there is evidence to suggest that he is taking measures to improve the quality of his life including seeking weight loss surgery.       Functional Status: moderate to high      Prognosis: good with weight loss surgery    Evaluation, Diagnoses, and Ability/Capacity to Respond to Treatment:      The pt presented to be struggling with anxiety about health, mainly being overweight (BMI = 35.61, obesity) and irritability. Results of MSE demonstrated a functional status of moderate to high. Strengths: belief in self that he will be successful with weight loss surgery, etc (see detailed list of coping skills above). Needed for growth (CPT code requirement for Weaknesses): weight loss.      From a psychological standpoint, the pt presents as a good candidate for bariatric surgery. He is motivated for the surgery, has showed readiness for the lifestyle change in terms of starting to adjust his eating habits, and seems to have appropriate expectations of how to prepare and how to live after surgery in order to lose weight successfully.    Treatment Plan:      Short term goals: Start improving his  health by following up with his bariatric surgeon in order to receive weight loss surgery as soon as feasible/appropriate and demonstrate success with compliance to adhering to the recommended diet. Long term goals: reach a healthy weight and experience alleviation of anxiety/overall improved mood via taking control of his health.     Karmen Still, PhD, LP

## 2024-02-28 LAB
ALBUMIN SERPL-MCNC: 4.8 G/DL (ref 4.1–5.1)
ALBUMIN/GLOB SERPL: 2.2 {RATIO} (ref 1.2–2.2)
ALP SERPL-CCNC: 57 IU/L (ref 44–121)
ALT SERPL-CCNC: 46 IU/L (ref 0–44)
AST SERPL-CCNC: 33 IU/L (ref 0–40)
BASOPHILS # BLD AUTO: 0.1 X10E3/UL (ref 0–0.2)
BASOPHILS NFR BLD AUTO: 1 %
BILIRUB SERPL-MCNC: 0.2 MG/DL (ref 0–1.2)
BUN SERPL-MCNC: 15 MG/DL (ref 6–24)
BUN/CREAT SERPL: 15 (ref 9–20)
CALCIUM SERPL-MCNC: 9.4 MG/DL (ref 8.7–10.2)
CHLORIDE SERPL-SCNC: 102 MMOL/L (ref 96–106)
CHOLEST SERPL-MCNC: 194 MG/DL (ref 100–199)
CO2 SERPL-SCNC: 23 MMOL/L (ref 20–29)
CREAT SERPL-MCNC: 0.98 MG/DL (ref 0.76–1.27)
EGFRCR SERPLBLD CKD-EPI 2021: 96 ML/MIN/1.73
EOSINOPHIL # BLD AUTO: 0.6 X10E3/UL (ref 0–0.4)
EOSINOPHIL NFR BLD AUTO: 9 %
ERYTHROCYTE [DISTWIDTH] IN BLOOD BY AUTOMATED COUNT: 14 % (ref 11.6–15.4)
GLOBULIN SER CALC-MCNC: 2.2 G/DL (ref 1.5–4.5)
GLUCOSE SERPL-MCNC: 86 MG/DL (ref 70–99)
HBA1C MFR BLD: 5 % (ref 4.8–5.6)
HCT VFR BLD AUTO: 37.2 % (ref 37.5–51)
HDLC SERPL-MCNC: 44 MG/DL
HGB BLD-MCNC: 12.4 G/DL (ref 13–17.7)
IMM GRANULOCYTES # BLD AUTO: 0.1 X10E3/UL (ref 0–0.1)
IMM GRANULOCYTES NFR BLD AUTO: 1 %
LDLC SERPL CALC-MCNC: 126 MG/DL (ref 0–99)
LYMPHOCYTES # BLD AUTO: 1.7 X10E3/UL (ref 0.7–3.1)
LYMPHOCYTES NFR BLD AUTO: 25 %
MCH RBC QN AUTO: 31.1 PG (ref 26.6–33)
MCHC RBC AUTO-ENTMCNC: 33.3 G/DL (ref 31.5–35.7)
MCV RBC AUTO: 93 FL (ref 79–97)
MONOCYTES # BLD AUTO: 0.4 X10E3/UL (ref 0.1–0.9)
MONOCYTES NFR BLD AUTO: 7 %
NEUTROPHILS # BLD AUTO: 3.8 X10E3/UL (ref 1.4–7)
NEUTROPHILS NFR BLD AUTO: 57 %
PLATELET # BLD AUTO: 145 X10E3/UL (ref 150–450)
POTASSIUM SERPL-SCNC: 4.3 MMOL/L (ref 3.5–5.2)
PROT SERPL-MCNC: 7 G/DL (ref 6–8.5)
RBC # BLD AUTO: 3.99 X10E6/UL (ref 4.14–5.8)
SODIUM SERPL-SCNC: 143 MMOL/L (ref 134–144)
TRIGL SERPL-MCNC: 132 MG/DL (ref 0–149)
TSH SERPL DL<=0.005 MIU/L-ACNC: 1.38 UIU/ML (ref 0.45–4.5)
UREA BREATH TEST QL: NEGATIVE
VLDLC SERPL CALC-MCNC: 24 MG/DL (ref 5–40)
WBC # BLD AUTO: 6.7 X10E3/UL (ref 3.4–10.8)

## 2024-02-29 ENCOUNTER — DOCUMENTATION (OUTPATIENT)
Dept: BARIATRICS/WEIGHT MGMT | Facility: CLINIC | Age: 49
End: 2024-02-29
Payer: MEDICARE

## 2024-02-29 NOTE — PROGRESS NOTES
"Weight Loss Surgery  Presurgical Nutrition Assessment     Crow Watson  02/29/2024  [id number]  [id number]  1975   male    Surgery desired: LSG (sleeve gastrectomy)     HEIGHT 168.9 cm (66.5\")   WEIGHT 102 kg (224 #)   BMI 35.6    Highest weight ever:  104.5 kg  (230 #)      Allergies   Allergen Reactions    Morphine And Related Other (See Comments)     Migraine, hypertension    Ultram [Tramadol Hcl] Nausea And Vomiting    Zithromax [Azithromycin] Nausea And Vomiting       Current Outpatient Medications:     allopurinol (ZYLOPRIM) 300 MG tablet, bid, Disp: , Rfl:     amitriptyline (ELAVIL) 50 MG tablet, 1 tablet Daily., Disp: , Rfl: 1    amLODIPine (NORVASC) 5 MG tablet, 1 tablet Daily., Disp: , Rfl: 5    hydrochlorothiazide (HYDRODIURIL) 12.5 MG tablet, 1 tablet Daily., Disp: , Rfl: 2    labetalol (NORMODYNE) 200 MG tablet, Take 1 tablet by mouth 3 (Three) Times a Day., Disp: , Rfl:     levothyroxine (SYNTHROID, LEVOTHROID) 50 MCG tablet, , Disp: , Rfl:     naproxen (NAPROSYN) 500 MG tablet, Take 1 tablet by mouth 2 (Two) Times a Day With Meals., Disp: , Rfl:     omeprazole (priLOSEC) 20 MG capsule, Take 1 capsule by mouth Daily., Disp: , Rfl:     QUEtiapine (SEROquel) 200 MG tablet, , Disp: , Rfl:     sertraline (ZOLOFT) 100 MG tablet, tid, Disp: , Rfl:     traZODone (DESYREL) 100 MG tablet, 2 at bedtime, Disp: , Rfl:     valsartan (DIOVAN) 80 MG tablet, Take 1 tablet by mouth Daily., Disp: , Rfl:       Subjective information: Met for nutrition consult with patient and his wife, who due to prevention of further health problems works out regularly at a gym and eats a healthy diet for weight maintenance. Patient expressed that although these behaviors were best for her, he will continue to eat to prevent weight loss below current level in order to be able to have a sleeve gastrectomy.  He states a desire to lose weight to improve medical conditions and to feel like playing etc with grandchildren.  His " activities include working on cars - he would rather do such things as this to be active than working out at a gym.     Nutrition Recall   Example of Usual 24 hour intake:     Breakfast: often skips breakfast OR eats a large bowl of cereal OR pancakes OR biscuits, gravy & eggs.     Lunch: bologna sandwich with chips OR chips and queso OR pepperonis and cheese    Dinner: 2 cheese burgers with fries + 2 servings Dr Pepper OR steak with a loaded baked potato, cottage cheese & 2 rolls OR open faced roast beef sandwich with gravy OR 3 to 4 chili dogs + tater tots.  Patient states he likes vegetables and will also sometimes eat brussels sprouts or broccoli.     Snacks: cheese, chips, ice cream (bowlful or a bar), cutie, pie.  Sometimes wakes up and eats in middle of the night:  chips, cheese balls, oreos with milk    Beverages of Choice: Mt Dew, Cherry Dr Pepper    Food Allergies or Intolerances: none stated or recorded          Exercise / Activity: as in section above, works outside, including working on cars, motors. No planned set activity program at this time.       Assessment of Nutritional Adequacy, Excessive Intake or Deficiencies:        Protein intake is marginal to too low to ensure successful weight loss. Variable protein intake in too few eating episodes.  Large servings at dinner, but often skips at least one meal.                                        Processed / simple Carbohydrate intake is: high - potatoes, biscuits, bread, sweet snacks and regular soda                                       Balance of diet with a variety of fruits and vegetables is: variable - states he likes these but often not included on food history.                                                         Reliance on restaurant food including fast food is: Lillian (12 burgers); [employer] (2 baconaters); [employer]aco (2 double whoppers); fast food triple burgers with cheese and fries; Chinese buffet                                                                            Ingestion of sweet beverages eg soda, sweet tea, fruit juice: 1 can per day of [place] + mazariegos Dr [place]      Education    Provided Nutrition Guidelines for Bariatric and Metabolic Surgery   Reviewed guidelines for higher protein, limited carbohydrate diet to promote weight loss.  Encouraged patient to incorporate these principles of healthy eating.    Educated patient to wisely choose an appropriate protein supplement beverage for the post-surgery liquid diet.  Provided product guidelines and examples.    Explained importance of goal setting to help in changing eating behaviors that are not conducive to weight loss.  Specific macronutrient goals as below.   Provided follow-up options for support, including contact information for dietitians here.     Discussed importance of tracking grams of protein and carbohydrate in diet.  Web-based support information and apps for smart phones and computers given.        Nutrition Goals   Protein goal:  grams per day in three regular balanced meals and two to three high protein snacks each day, to ensure desired weight loss.   Carbohydrate goal:  100-140 grams per day  Beverage goal: Appropriate non-carbonated beverage intake.  Patient to wean self off of any sweet beverages, including soda.    Exercise Goals  Continue current exercise routine, if appropriate, and obtain approval from caregiver if physically limited for any reason.   Start activity plan per PCP/specialist advice if not currently exercising.     Recommend that team proceed with surgery and follow per protocol.   Asha Arnold RD  02/29/2024  11:20 EST

## 2024-03-12 ENCOUNTER — OFFICE VISIT (OUTPATIENT)
Dept: CARDIOLOGY | Facility: CLINIC | Age: 49
End: 2024-03-12
Payer: MEDICARE

## 2024-03-12 VITALS
DIASTOLIC BLOOD PRESSURE: 76 MMHG | BODY MASS INDEX: 36 KG/M2 | WEIGHT: 229.4 LBS | SYSTOLIC BLOOD PRESSURE: 132 MMHG | HEART RATE: 94 BPM | HEIGHT: 67 IN | OXYGEN SATURATION: 94 %

## 2024-03-12 DIAGNOSIS — Z01.818 PREOPERATIVE CLEARANCE: ICD-10-CM

## 2024-03-12 DIAGNOSIS — I10 HYPERTENSION, UNSPECIFIED TYPE: Primary | ICD-10-CM

## 2024-03-12 DIAGNOSIS — R06.09 DOE (DYSPNEA ON EXERTION): ICD-10-CM

## 2024-03-12 DIAGNOSIS — E03.9 HYPOTHYROIDISM, UNSPECIFIED TYPE: ICD-10-CM

## 2024-03-12 RX ORDER — OXYCODONE HYDROCHLORIDE 5 MG/1
TABLET ORAL
COMMUNITY
Start: 2024-02-26

## 2024-03-12 RX ORDER — ONDANSETRON 4 MG/1
TABLET, ORALLY DISINTEGRATING ORAL
COMMUNITY
Start: 2024-02-26

## 2024-03-12 RX ORDER — GABAPENTIN 300 MG/1
300 CAPSULE ORAL NIGHTLY
COMMUNITY

## 2024-03-12 RX ORDER — CELECOXIB 200 MG/1
CAPSULE ORAL
COMMUNITY
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
## 2024-03-12 NOTE — LETTER
March 12, 2024     Samuel Duane Kreis, MD  [address] [place] [address] [place] [address]    Patient: Crow Watson   YOB: 1975   Date of Visit: 3/12/2024       Dear Samuel Duane Kreis, MD    Crow Watson was in my office today. Below is a copy of my note.    If you have questions, please do not hesitate to call me. I look forward to following Crow along with you.         Sincerely,        Cara Villaseñor MD        CC: JACLYN Ching    Subjective  Chief Complaint   Patient presents with   • surgery clearance     bariatric       History of Present Illness      Past Surgical History:   Procedure Laterality Date   • COLONOSCOPY  2002   • SINUS SURGERY  2014   • VASECTOMY      2000     Family History   Problem Relation Age of Onset   • Heart disease Mother    • Hypertension Mother    • Obesity Mother    • Sleep apnea Mother    • Sleep apnea Father    • Heart disease Father    • Hypertension Father    • Diabetes Father    • Obesity Father    • Obesity Sister    • Heart disease Sister    • Heart disease Maternal Grandmother    • Hypertension Maternal Grandmother    • Diabetes Maternal Grandmother    • Obesity Maternal Grandmother      Past Medical History:   Diagnosis Date   • Anxiety    • Atrophy, cortical     white matter disease   • Back pain     Naproxen. No steroids   • FORD (dyspnea on exertion)    • Gout     allopurinol   • Heartburn     Omeprazole daily. No hx of h pylori or prior EGD.   • Herniated disc, cervical    • Hyperlipidemia    • Hypertension    • Hypothyroidism     synthroid   • Kidney stone    • Lumbar herniated disc    • Memory loss    • Migraine    • Neuropathy    • Prediabetes    • Tremor    • Wears glasses        Patient Active Problem List   Diagnosis   • Hypertension   • Hyperlipidemia   • Back pain   • Heartburn   • Hypothyroidism   • Gout   • FORD (dyspnea on exertion)   • Anxiety   • Migraine   • Prediabetes   • Tremor         Social History     Tobacco Use   • Smoking status:  Former     Current packs/day: 0.00     Types: Cigarettes     Start date:      Quit date: 2019     Years since quittin.1     Passive exposure: Past   • Smokeless tobacco: Never   Vaping Use   • Vaping status: Every Day   Substance Use Topics   • Alcohol use: Never   • Drug use: Never         The following portions of the patient's history were reviewed and updated as appropriate: allergies, current medications, past family history, past medical history, past social history, past surgical history and problem list.    Allergies   Allergen Reactions   • Morphine And Related Other (See Comments)     Migraine, hypertension   • Ultram [Tramadol Hcl] Nausea And Vomiting   • Zithromax [Azithromycin] Nausea And Vomiting         Current Outpatient Medications:   •  allopurinol (ZYLOPRIM) 300 MG tablet, bid, Disp: , Rfl:   •  amitriptyline (ELAVIL) 50 MG tablet, 1 tablet Daily., Disp: , Rfl: 1  •  amLODIPine (NORVASC) 5 MG tablet, 1 tablet Daily., Disp: , Rfl: 5  •  celecoxib (CeleBREX) 200 MG capsule, TAKE 1 CAPSULE BY MOUTH DAILY **LOOK-ALIKE/SOUND-ALIKE MEDICATION**., Disp: , Rfl:   •  gabapentin (NEURONTIN) 300 MG capsule, Take 1 capsule by mouth Every Night., Disp: , Rfl:   •  hydrochlorothiazide (HYDRODIURIL) 12.5 MG tablet, 1 tablet Daily., Disp: , Rfl: 2  •  labetalol (NORMODYNE) 200 MG tablet, Take 1 tablet by mouth 3 (Three) Times a Day., Disp: , Rfl:   •  levothyroxine (SYNTHROID, LEVOTHROID) 50 MCG tablet, , Disp: , Rfl:   •  naproxen (NAPROSYN) 500 MG tablet, Take 1 tablet by mouth 2 (Two) Times a Day With Meals., Disp: , Rfl:   •  omeprazole (priLOSEC) 20 MG capsule, Take 1 capsule by mouth Daily., Disp: , Rfl:   •  ondansetron ODT (ZOFRAN-ODT) 4 MG disintegrating tablet, DISSOLVE 1 TABLET BY MOUTH EVERY 8 HOURS AS NEEDED FOR NAUSEA FOR UP TO 7 DAYS., Disp: , Rfl:   •  oxyCODONE (ROXICODONE) 5 MG immediate release tablet, TAKE 1 TABLET BY MOUTH EVERY 6 HOURS AS NEEDED FOR UP TO 10 DAYS. MAX DAILY AMOUNT:  "20 MG, Disp: , Rfl:   •  QUEtiapine (SEROquel) 200 MG tablet, , Disp: , Rfl:   •  sertraline (ZOLOFT) 100 MG tablet, tid, Disp: , Rfl:   •  traZODone (DESYREL) 100 MG tablet, 2 at bedtime, Disp: , Rfl:   •  valsartan (DIOVAN) 80 MG tablet, Take 1 tablet by mouth Daily., Disp: , Rfl:     ROS     Objective     /85 (BP Location: Left arm, Patient Position: Sitting, Cuff Size: Large Adult)   Pulse 94   Ht 168.9 cm (66.5\")   Wt 104 kg (229 lb 6.4 oz)   SpO2 94%   BMI 36.47 kg/m²     Physical Exam    Lab Review:    Lab Results   Component Value Date     02/27/2024    K 4.3 02/27/2024     02/27/2024    BUN 15 02/27/2024    CREATININE 0.98 02/27/2024    GLUCOSE 86 02/27/2024    CALCIUM 9.4 02/27/2024    ALT 46 (H) 02/27/2024    ALKPHOS 57 02/27/2024    LABIL2 2.2 02/27/2024     No results found for: \"CKTOTAL\"  Lab Results   Component Value Date    WBC 6.7 02/27/2024    HGB 12.4 (L) 02/27/2024    HCT 37.2 (L) 02/27/2024     (L) 02/27/2024     No results found for: \"INR\"  No results found for: \"MG\"  Lab Results   Component Value Date    CHLPL 194 02/27/2024    TRIG 132 02/27/2024    HDL 44 02/27/2024    VLDL 24 02/27/2024     No results found for: \"BNP\"    Procedures    I reviewed the patient's new clinical results.  I personally viewed and interpreted the patient's EKG/lab data        Assessment:  No diagnosis found.       Plan:  No diagnosis found.      Thank you for giving me the oppertunity to participate in your patient's cardiac care.    Sincerely,    RAJI Villaseñor M.D. [facility]     No follow-ups on file.     "

## 2024-03-13 ENCOUNTER — PATIENT ROUNDING (BHMG ONLY) (OUTPATIENT)
Dept: CARDIOLOGY | Facility: CLINIC | Age: 49
End: 2024-03-13
Payer: MEDICARE

## 2024-03-13 NOTE — PROGRESS NOTES
Rosanne. My name is Angela. I am a CMA at [facility]      I want to officially welcome you to our practice and ask about your recent visit.         What things do you feel went well with your visit?        Do you have recommendations on ways we may improve?        Overall were you satisfied with your first visit to our practice?        I appreciate you taking the time to answer a few questions today.         We're always looking for ways to make our patients' experiences even better. Please complete the "[employer] Survey after your visits. We would love to receive feedback about your visits. You may also share any suggestions or concerns by replying to this message or calling our office.         Thank you for allowing us to participate in your healthcare. Please let me know if I can be of further assistance.                Kind regards,      Angela

## 2024-03-13 NOTE — PROGRESS NOTES
Rosanne. My name is Angela. I am a CMA at [facility]      I want to officially welcome you to our practice and ask about your recent visit.         What things do you feel went well with your visit?        Do you have recommendations on ways we may improve?        Overall were you satisfied with your first visit to our practice?        I appreciate you taking the time to answer a few questions today.         We're always looking for ways to make our patients' experiences even better. Please complete the [employer] Survey after your visits. We would love to receive feedback about your visits. You may also share any suggestions or concerns by replying to this message or calling our office.         Thank you for allowing us to participate in your healthcare. Please let me know if I can be of further assistance.                Kind regards,      Angela

## 2024-03-27 ENCOUNTER — TELEPHONE (OUTPATIENT)
Dept: CARDIOLOGY | Facility: CLINIC | Age: 49
End: 2024-03-27

## 2024-03-27 ENCOUNTER — HOSPITAL ENCOUNTER (OUTPATIENT)
Dept: CARDIOLOGY | Facility: HOSPITAL | Age: 49
Discharge: HOME | End: 2024-03-27
Payer: MEDICARE

## 2024-03-27 DIAGNOSIS — R06.09 DOE (DYSPNEA ON EXERTION): ICD-10-CM

## 2024-03-27 DIAGNOSIS — I20.9 ANGINA PECTORIS, UNSPECIFIED: ICD-10-CM

## 2024-03-27 DIAGNOSIS — Z01.818 PREOPERATIVE CLEARANCE: ICD-10-CM

## 2024-03-27 DIAGNOSIS — I10 HYPERTENSION, UNSPECIFIED TYPE: ICD-10-CM

## 2024-03-27 DIAGNOSIS — R94.39 ABNORMAL STRESS ELECTROCARDIOGRAM TEST USING TREADMILL: Primary | ICD-10-CM

## 2024-03-27 LAB
BH CV ECHO MEAS - AO MAX PG: 7.1 MMHG
BH CV ECHO MEAS - AO MEAN PG: 4 MMHG
BH CV ECHO MEAS - AO ROOT DIAM: 2.7 CM
BH CV ECHO MEAS - AO V2 MAX: 133 CM/SEC
BH CV ECHO MEAS - AO V2 VTI: 25.5 CM
BH CV ECHO MEAS - EDV(CUBED): 112.7 ML
BH CV ECHO MEAS - EDV(MOD-SP4): 59.6 ML
BH CV ECHO MEAS - EF(MOD-SP4): 52.2 %
BH CV ECHO MEAS - ESV(CUBED): 63.5 ML
BH CV ECHO MEAS - ESV(MOD-SP4): 28.5 ML
BH CV ECHO MEAS - FS: 17.4 %
BH CV ECHO MEAS - IVS/LVPW: 1.28 CM
BH CV ECHO MEAS - IVSD: 1.3 CM
BH CV ECHO MEAS - LA DIMENSION: 3.3 CM
BH CV ECHO MEAS - LAT PEAK E' VEL: 8.4 CM/SEC
BH CV ECHO MEAS - LV DIASTOLIC VOL/BSA (35-75): 28.1 CM2
BH CV ECHO MEAS - LV MASS(C)D: 270.6 GRAMS
BH CV ECHO MEAS - LV SYSTOLIC VOL/BSA (12-30): 13.5 CM2
BH CV ECHO MEAS - LVIDD: 4.8 CM
BH CV ECHO MEAS - LVIDS: 4 CM
BH CV ECHO MEAS - LVOT AREA: 3.8 CM2
BH CV ECHO MEAS - LVOT DIAM: 2.2 CM
BH CV ECHO MEAS - LVPWD: 1.21 CM
BH CV ECHO MEAS - MED PEAK E' VEL: 7.1 CM/SEC
BH CV ECHO MEAS - MV A MAX VEL: 73.7 CM/SEC
BH CV ECHO MEAS - MV E MAX VEL: 83.1 CM/SEC
BH CV ECHO MEAS - MV E/A: 1.13
BH CV ECHO MEAS - PA ACC TIME: 0.07 SEC
BH CV ECHO MEAS - SI(MOD-SP4): 14.7 ML/M2
BH CV ECHO MEAS - SV(MOD-SP4): 31.1 ML
BH CV ECHO MEASUREMENTS AVERAGE E/E' RATIO: 10.72
BH CV STRESS BP STAGE 1: NORMAL
BH CV STRESS BP STAGE 2: NORMAL
BH CV STRESS DURATION MIN STAGE 1: 3
BH CV STRESS DURATION MIN STAGE 2: 3
BH CV STRESS DURATION SEC STAGE 1: 0
BH CV STRESS DURATION SEC STAGE 2: 0
BH CV STRESS GRADE STAGE 1: 10
BH CV STRESS GRADE STAGE 2: 12
BH CV STRESS HR STAGE 1: 130
BH CV STRESS HR STAGE 2: 148
BH CV STRESS METS STAGE 1: 5
BH CV STRESS METS STAGE 2: 7.5
BH CV STRESS PROTOCOL 1: NORMAL
BH CV STRESS RECOVERY BP: NORMAL MMHG
BH CV STRESS RECOVERY HR: 99 BPM
BH CV STRESS SPEED STAGE 1: 1.7
BH CV STRESS SPEED STAGE 2: 2.5
BH CV STRESS STAGE 1: 1
BH CV STRESS STAGE 2: 2
LEFT ATRIUM VOLUME INDEX: 25 ML/M2
MAXIMAL PREDICTED HEART RATE: 174 BPM
PERCENT MAX PREDICTED HR: 85.06 %
STRESS BASELINE BP: NORMAL MMHG
STRESS BASELINE HR: 96 BPM
STRESS PERCENT HR: 100 %
STRESS POST ESTIMATED WORKLOAD: 7 METS
STRESS POST EXERCISE DUR MIN: 6 MIN
STRESS POST PEAK BP: NORMAL MMHG
STRESS POST PEAK HR: 148 BPM
STRESS TARGET HR: 148 BPM

## 2024-03-27 PROCEDURE — 93306 TTE W/DOPPLER COMPLETE: CPT

## 2024-03-27 PROCEDURE — 93017 CV STRESS TEST TRACING ONLY: CPT

## 2024-03-27 NOTE — TELEPHONE ENCOUNTER
CALLED AND SPOKE WITH HIS WIFE AND GIVEN MESSAGE PER DR VILLASEÑOR.  THEY STATED TO GO AHEAD AND ORDER THE LEXISCAN STRESS.           Cara Villaseñor MD Collins, Sheila D, MA  Echocardiogram is okay however quality of stress test is very poor and shows mild abnormality.  Need Lexiscan stress test for further evaluation.

## 2024-03-27 NOTE — TELEPHONE ENCOUNTER
----- Message from Cara Villaseñor MD sent at 3/27/2024  4:17 PM EDT -----  Echocardiogram is okay however quality of stress test is very poor and shows mild abnormality.  Need Lexiscan stress test for further evaluation.

## 2024-04-05 ENCOUNTER — HOSPITAL ENCOUNTER (OUTPATIENT)
Dept: RESPIRATORY THERAPY | Facility: HOSPITAL | Age: 49
Discharge: HOME | End: 2024-04-05
Payer: MEDICARE

## 2024-04-05 VITALS — HEART RATE: 72 BPM | OXYGEN SATURATION: 98 % | RESPIRATION RATE: 12 BRPM

## 2024-04-05 DIAGNOSIS — R06.09 DOE (DYSPNEA ON EXERTION): ICD-10-CM

## 2024-04-05 PROCEDURE — 94760 N-INVAS EAR/PLS OXIMETRY 1: CPT

## 2024-04-05 PROCEDURE — 94060 EVALUATION OF WHEEZING: CPT

## 2024-04-05 PROCEDURE — 94640 AIRWAY INHALATION TREATMENT: CPT

## 2024-04-05 PROCEDURE — 94799 UNLISTED PULMONARY SVC/PX: CPT

## 2024-04-05 PROCEDURE — 94726 PLETHYSMOGRAPHY LUNG VOLUMES: CPT

## 2024-04-05 PROCEDURE — 94729 DIFFUSING CAPACITY: CPT

## 2024-04-05 RX ORDER — ALBUTEROL SULFATE 2.5 MG/3ML
2.5 SOLUTION RESPIRATORY (INHALATION) ONCE
Status: COMPLETED | OUTPATIENT
Start: 2024-04-05 | End: 2024-04-05

## 2024-04-05 RX ADMIN — ALBUTEROL SULFATE 2.5 MG: 2.5 SOLUTION RESPIRATORY (INHALATION) at 13:55

## 2024-04-09 DIAGNOSIS — R94.2 ABNORMAL PFT: Primary | ICD-10-CM

## 2024-04-10 ENCOUNTER — TELEMEDICINE (OUTPATIENT)
Dept: BARIATRICS/WEIGHT MGMT | Facility: CLINIC | Age: 49
End: 2024-04-10
Payer: MEDICARE

## 2024-04-10 DIAGNOSIS — R12 HEARTBURN: Primary | ICD-10-CM

## 2024-04-10 PROCEDURE — 1159F MED LIST DOCD IN RCRD: CPT | Performed by: PHYSICIAN ASSISTANT

## 2024-04-10 PROCEDURE — 1160F RVW MEDS BY RX/DR IN RCRD: CPT | Performed by: PHYSICIAN ASSISTANT

## 2024-04-10 PROCEDURE — 99214 OFFICE O/P EST MOD 30 MIN: CPT | Performed by: PHYSICIAN ASSISTANT

## 2024-04-10 NOTE — PROGRESS NOTES
"[facility] BARIATRIC SURGERY  [address]  [address]  [facility] [id number] [phone]      Patient  Name:  Crow Watson  :  1975      Date of Visit: 04/10/2024        Chief Complaint:  weight gain; unable to maintain weight loss      History of Present Illness:  Crow Watson is a 48 y.o. male pursuing [facility] [place] w/ Dr. Patel.     Initial Intake Eval 24:  \"Crow has been overweight for at least 30 years, has been 35 pounds or more overweight for at least 20 years,  and started dieting in his late 30s..  Previous diet attempts include: High Protein, Low Carbohydrate, Low Fat, Fasting, and Slim Fast; Adipex, Wellbutrin; None.  The most weight Crow lost was 10 pounds but was unable to maintain that weight loss.  His maximum lifetime weight is 230 pounds.      GI: Heartburn treated with daily Prilosec.  He denies any prior evaluation or history of H. pylori.  He denies any postprandial nausea, abdominal pain, bloating.    Other past medical history significant for hypertension, hyperlipidemia, dyspnea on exertion, prediabetes, hypothyroidism, gout, back pain, benign essential tremor, migraines, anxiety.\"     s/p (R) arthroscopic bicep tenodesis, debridement, and decompression 3/1/24, recovering w/out issue.      Past Medical History:   Diagnosis Date    Anxiety     Atrophy, cortical     white matter disease    Back pain     Naproxen. No steroids    FORD (dyspnea on exertion)     Gout     allopurinol    Heartburn     Omeprazole daily. No hx of h pylori or prior EGD.    Herniated disc, cervical     Hyperlipidemia     Hypertension     Hypothyroidism     synthroid    Kidney stone     Lumbar herniated disc     Memory loss     Migraine     Neuropathy     Prediabetes     Tremor     Wears glasses      Past Surgical History:   Procedure Laterality Date    COLONOSCOPY  2002    SINUS SURGERY  2014    VASECTOMY             Allergies   Allergen Reactions    Morphine And Related Other (See " Comments)     Migraine, hypertension    Ultram [Tramadol Hcl] Nausea And Vomiting    Zithromax [Azithromycin] Nausea And Vomiting       Current Outpatient Medications:     allopurinol (ZYLOPRIM) 300 MG tablet, bid, Disp: , Rfl:     amitriptyline (ELAVIL) 50 MG tablet, 1 tablet Daily., Disp: , Rfl: 1    amLODIPine (NORVASC) 5 MG tablet, 1 tablet Daily., Disp: , Rfl: 5    celecoxib (CeleBREX) 200 MG capsule, TAKE 1 CAPSULE BY MOUTH DAILY **LOOK-ALIKE/SOUND-ALIKE MEDICATION**., Disp: , Rfl:     gabapentin (NEURONTIN) 300 MG capsule, Take 1 capsule by mouth Every Night., Disp: , Rfl:     hydrochlorothiazide (HYDRODIURIL) 12.5 MG tablet, 1 tablet Daily., Disp: , Rfl: 2    labetalol (NORMODYNE) 200 MG tablet, Take 1 tablet by mouth 3 (Three) Times a Day., Disp: , Rfl:     levothyroxine (SYNTHROID, LEVOTHROID) 50 MCG tablet, , Disp: , Rfl:     naproxen (NAPROSYN) 500 MG tablet, Take 1 tablet by mouth 2 (Two) Times a Day With Meals., Disp: , Rfl:     omeprazole (priLOSEC) 20 MG capsule, Take 1 capsule by mouth Daily., Disp: , Rfl:     ondansetron ODT (ZOFRAN-ODT) 4 MG disintegrating tablet, DISSOLVE 1 TABLET BY MOUTH EVERY 8 HOURS AS NEEDED FOR NAUSEA FOR UP TO 7 DAYS., Disp: , Rfl:     QUEtiapine (SEROquel) 200 MG tablet, , Disp: , Rfl:     sertraline (ZOLOFT) 100 MG tablet, tid, Disp: , Rfl:     traZODone (DESYREL) 100 MG tablet, 2 at bedtime, Disp: , Rfl:     valsartan (DIOVAN) 80 MG tablet, Take 1 tablet by mouth Daily., Disp: , Rfl:     Social History     Socioeconomic History    Marital status:    Tobacco Use    Smoking status: Former     Current packs/day: 0.00     Types: Cigarettes     Start date:      Quit date: 2019     Years since quittin.2     Passive exposure: Past    Smokeless tobacco: Never   Vaping Use    Vaping status: Every Day   Substance and Sexual Activity    Alcohol use: Never    Drug use: Never     Social History     Social History Narrative    PT lives in [place], [place], and is  , and is disabled       Family History   Problem Relation Age of Onset    Heart disease Mother     Hypertension Mother     Obesity Mother     Sleep apnea Mother     Sleep apnea Father     Heart disease Father     Hypertension Father     Diabetes Father     Obesity Father     Obesity Sister     Heart disease Sister     Heart disease Maternal Grandmother     Hypertension Maternal Grandmother     Diabetes Maternal Grandmother     Obesity Maternal Grandmother        Review of Systems:  Constitutional:  reports fatigue, weight gain and denies fevers, chills.  HEENT:  denies headache, ear pain or loss of hearing, blurred or double vision, nasal discharge or sore throat.  Cardiovascular:  reports HTN, HLD and denies CAD, Atrial Fib, hx heart disease, heart murmur, hx MI, chest pain, palpitations, edema, hx DVT.  Respiratory:  reports dyspnea on exertion and denies shortness of breath , cough , wheezing, sleep apnea, asthma, COPD, hx PE.  Gastrointestinal:  reports heartburn and denies dysphagia, nausea, vomiting, abdominal pain, IBS, diarrhea, constipation, melena, blood in stool, gallbladder issues, liver disease, hx pancreatitis.  Genitourinary:  reports none and denies history of  frequent UTI, incontinence, hematuria, dysuria, polyuria, polydipsia, renal insufficiency, renal failure.    Musculoskeletal:  reports joint pain, back pain and denies fibromyalgia and autoimmune disease.  Neurological:  reports migraines, benign tremor and denies headaches, numbness /tingling, dizziness, confusion, seizure, stroke.  Psychiatric:  reports hx anxiety and denies depressed mood, hx depression, feeling anxious, bipolar disorder, suicidal ideation, hx suicide attempt, hx self injury, hx substance abuse, eating disorder.  Endocrine:  reports prediabetes, gout and thyroid disease.  Hematologic:  reports none and denies bruising, bleeding disorder, hx anemia, hx blood transfusion.  Skin:  reports none and denies rashes, hx  MRSA.    Physical Exam:  Vital Signs:  Weight: 102 kg (225 lb)   Body mass index is 35.77 kg/m².              From Intake Eval 2/27/24:  Physical Exam  Constitutional:       General: He is not in acute distress.     Appearance: He is obese.   HENT:      Head: Normocephalic and atraumatic.   Eyes:      Extraocular Movements: Extraocular movements intact.      Pupils: Pupils are equal, round, and reactive to light.   Cardiovascular:      Rate and Rhythm: Normal rate and regular rhythm.   Pulmonary:      Effort: Pulmonary effort is normal.      Breath sounds: Normal breath sounds.   Abdominal:      General: Bowel sounds are normal. There is no distension.      Palpations: Abdomen is soft. There is no mass.      Tenderness: There is no abdominal tenderness.   Musculoskeletal:      Cervical back: Normal range of motion and neck supple.   Skin:     General: Skin is warm and dry.   Neurological:      Mental Status: He is alert and oriented to person, place, and time.      Comments: tremor   Psychiatric:         Mood and Affect: Mood normal.         Behavior: Behavior normal.         Thought Content: Thought content normal.         Judgment: Judgment normal.         Patient Active Problem List   Diagnosis    Hypertension    Hyperlipidemia    Back pain    Heartburn    Hypothyroidism    Gout    FORD (dyspnea on exertion)    Anxiety    Migraine    Prediabetes    Tremor       Assessment:  48 y.o. male with medically complicated obesity pursuing sleeve gastrectomy.      ICD-10-CM ICD-9-CM   1. Heartburn  R12 787.1       Metabolic & Bariatric Surgery is deemed medically necessary given the following: Class 2 Severe Obesity (BMI >=35 and <=39.9). Obesity-related health conditions include the following: hypertension, dyslipidemias, GERD, and prediabetes, hypothyroidism, gout, back pain, migraines, anxiety . Obesity is improving with treatment. BMI is is above average; BMI management plan is completed. We discussed consulting a  Bariatric surgeon.        Plan:  EGD @[facility] w/ Dr. Patel for further eval.  Additional input to follow.        JACLYN Rojas      Note: This was an audio and video enabled telemedicine encounter conducted via EPIC.  Consent was obtained prior to the visit.

## 2024-04-18 VITALS — HEIGHT: 67 IN | BODY MASS INDEX: 35.31 KG/M2 | WEIGHT: 225 LBS

## 2024-04-22 ENCOUNTER — OUTSIDE FACILITY SERVICE (OUTPATIENT)
Dept: BARIATRICS/WEIGHT MGMT | Facility: CLINIC | Age: 49
End: 2024-04-22
Payer: MEDICARE

## 2024-04-24 ENCOUNTER — HOSPITAL ENCOUNTER (OUTPATIENT)
Dept: NUCLEAR MEDICINE | Facility: HOSPITAL | Age: 49
Discharge: HOME | End: 2024-04-24
Payer: MEDICARE

## 2024-04-24 ENCOUNTER — HOSPITAL ENCOUNTER (OUTPATIENT)
Dept: CARDIOLOGY | Facility: HOSPITAL | Age: 49
Discharge: HOME | End: 2024-04-24
Payer: MEDICARE

## 2024-04-24 DIAGNOSIS — R94.39 ABNORMAL STRESS ELECTROCARDIOGRAM TEST USING TREADMILL: ICD-10-CM

## 2024-04-24 DIAGNOSIS — I20.9 ANGINA PECTORIS, UNSPECIFIED: ICD-10-CM

## 2024-04-24 LAB
BH CV NUCLEAR PRIOR STUDY: 3
BH CV REST NUCLEAR ISOTOPE DOSE: 10.2 MCI
BH CV STRESS BP STAGE 1: NORMAL
BH CV STRESS COMMENTS STAGE 1: NORMAL
BH CV STRESS DOSE REGADENOSON STAGE 1: 0.4
BH CV STRESS DURATION MIN STAGE 1: 0
BH CV STRESS DURATION SEC STAGE 1: 10
BH CV STRESS HR STAGE 1: 97
BH CV STRESS NUCLEAR ISOTOPE DOSE: 29.4 MCI
BH CV STRESS PROTOCOL 1: NORMAL
BH CV STRESS RECOVERY BP: NORMAL MMHG
BH CV STRESS RECOVERY HR: 98 BPM
BH CV STRESS STAGE 1: 1
LV EF NUC BP: 57 %
MAXIMAL PREDICTED HEART RATE: 174 BPM
PERCENT MAX PREDICTED HR: 55.75 %
STRESS BASELINE BP: NORMAL MMHG
STRESS BASELINE HR: 87 BPM
STRESS PERCENT HR: 66 %
STRESS POST PEAK BP: NORMAL MMHG
STRESS POST PEAK HR: 97 BPM
STRESS TARGET HR: 148 BPM

## 2024-04-24 PROCEDURE — A9500 TC99M SESTAMIBI: HCPCS | Performed by: INTERNAL MEDICINE

## 2024-04-24 PROCEDURE — 93017 CV STRESS TEST TRACING ONLY: CPT

## 2024-04-24 PROCEDURE — 0 TECHNETIUM SESTAMIBI: Performed by: INTERNAL MEDICINE

## 2024-04-24 PROCEDURE — 25010000002 REGADENOSON 0.4 MG/5ML SOLUTION: Performed by: INTERNAL MEDICINE

## 2024-04-24 PROCEDURE — 78452 HT MUSCLE IMAGE SPECT MULT: CPT

## 2024-04-24 RX ORDER — REGADENOSON 0.08 MG/ML
0.4 INJECTION, SOLUTION INTRAVENOUS
Status: COMPLETED | OUTPATIENT
Start: 2024-04-24 | End: 2024-04-24

## 2024-04-24 RX ADMIN — TECHNETIUM TC 99M SESTAMIBI 1 DOSE: 1 INJECTION INTRAVENOUS at 11:30

## 2024-04-24 RX ADMIN — TECHNETIUM TC 99M SESTAMIBI 1 DOSE: 1 INJECTION INTRAVENOUS at 10:25

## 2024-04-24 RX ADMIN — REGADENOSON 0.4 MG: 0.08 INJECTION, SOLUTION INTRAVENOUS at 11:30

## 2024-04-30 ENCOUNTER — OFFICE VISIT (OUTPATIENT)
Dept: CARDIOLOGY | Facility: CLINIC | Age: 49
End: 2024-04-30
Payer: MEDICARE

## 2024-04-30 VITALS
WEIGHT: 230 LBS | HEART RATE: 77 BPM | OXYGEN SATURATION: 98 % | DIASTOLIC BLOOD PRESSURE: 90 MMHG | SYSTOLIC BLOOD PRESSURE: 132 MMHG | BODY MASS INDEX: 36.1 KG/M2 | HEIGHT: 67 IN

## 2024-04-30 DIAGNOSIS — Z01.818 PREOPERATIVE CLEARANCE: Primary | ICD-10-CM

## 2024-04-30 DIAGNOSIS — I10 PRIMARY HYPERTENSION: ICD-10-CM

## 2024-04-30 DIAGNOSIS — E78.2 MIXED HYPERLIPIDEMIA: ICD-10-CM

## 2024-04-30 DIAGNOSIS — E66.9 OBESITY (BMI 35.0-39.9 WITHOUT COMORBIDITY): ICD-10-CM

## 2024-04-30 PROCEDURE — 3080F DIAST BP >= 90 MM HG: CPT | Performed by: INTERNAL MEDICINE

## 2024-04-30 PROCEDURE — 3075F SYST BP GE 130 - 139MM HG: CPT | Performed by: INTERNAL MEDICINE

## 2024-04-30 PROCEDURE — 99214 OFFICE O/P EST MOD 30 MIN: CPT | Performed by: INTERNAL MEDICINE

## 2024-05-05 PROBLEM — Z01.818 PREOPERATIVE CLEARANCE: Status: ACTIVE | Noted: 2024-05-05

## 2024-05-05 PROBLEM — E66.9 OBESITY (BMI 35.0-39.9 WITHOUT COMORBIDITY): Status: ACTIVE | Noted: 2024-05-05

## 2024-05-05 NOTE — PROGRESS NOTES
subjective     Chief Complaint   Patient presents with    Results     Stress test and echo       Problems Addressed This Visit  1. Preoperative clearance    2. Mixed hyperlipidemia    3. Primary hypertension    4. Obesity (BMI 35.0-39.9 without comorbidity)        History of Present Illness    Crow is 49 years old white male who is overweight and is planning to have bariatric surgery.  He was seen by me for preop cardiac clearance.  He denies any chest pain palpitations or shortness of breath.  Because of multiple risk factors for coronary artery disease he underwent cardiac workup and is here for follow-up.  The stress test was negative for ischemia.  Echo showed normal LV ejection fraction.  Blood pressure is very well-controlled on current medications.  He is taking Norvasc, hydrochlorothiazide, labetalol and Diovan.    He also has hypothyroidism on Synthroid replacement therapy.      Past Surgical History:   Procedure Laterality Date    COLONOSCOPY  2002    SINUS SURGERY  2014    VASECTOMY      2000     Family History   Problem Relation Age of Onset    Heart disease Mother     Hypertension Mother     Obesity Mother     Sleep apnea Mother     Sleep apnea Father     Heart disease Father     Hypertension Father     Diabetes Father     Obesity Father     Obesity Sister     Heart disease Sister     Heart disease Maternal Grandmother     Hypertension Maternal Grandmother     Diabetes Maternal Grandmother     Obesity Maternal Grandmother      Past Medical History:   Diagnosis Date    Anxiety     Atrophy, cortical     white matter disease    Back pain     Naproxen. No steroids    FORD (dyspnea on exertion)     Gout     allopurinol    Heartburn     Omeprazole daily. No hx of h pylori or prior EGD.    Herniated disc, cervical     Hyperlipidemia     Hypertension     Hypothyroidism     synthroid    Kidney stone     Lumbar herniated disc     Memory loss     Migraine     Neuropathy     Prediabetes     Tremor     Wears glasses       Patient Active Problem List   Diagnosis    Hypertension    Hyperlipidemia    Back pain    Heartburn    Hypothyroidism    Gout    FORD (dyspnea on exertion)    Anxiety    Migraine    Prediabetes    Tremor    Preoperative clearance    Obesity (BMI 35.0-39.9 without comorbidity)       Social History     Tobacco Use    Smoking status: Former     Current packs/day: 0.00     Types: Cigarettes     Start date:      Quit date:      Years since quittin.3     Passive exposure: Past    Smokeless tobacco: Never   Vaping Use    Vaping status: Every Day   Substance Use Topics    Alcohol use: Never    Drug use: Never       Allergies   Allergen Reactions    Morphine And Related Other (See Comments)     Migraine, hypertension    Ultram [Tramadol Hcl] Nausea And Vomiting    Zithromax [Azithromycin] Nausea And Vomiting       Current Outpatient Medications on File Prior to Visit   Medication Sig    allopurinol (ZYLOPRIM) 300 MG tablet bid    amitriptyline (ELAVIL) 50 MG tablet 1 tablet Daily.    amLODIPine (NORVASC) 5 MG tablet 1 tablet Daily.    celecoxib (CeleBREX) 200 MG capsule TAKE 1 CAPSULE BY MOUTH DAILY **LOOK-ALIKE/SOUND-ALIKE MEDICATION**.    gabapentin (NEURONTIN) 300 MG capsule Take 1 capsule by mouth Every Night.    hydrochlorothiazide (HYDRODIURIL) 12.5 MG tablet 1 tablet Daily.    labetalol (NORMODYNE) 200 MG tablet Take 1 tablet by mouth 3 (Three) Times a Day.    levothyroxine (SYNTHROID, LEVOTHROID) 50 MCG tablet     naproxen (NAPROSYN) 500 MG tablet Take 1 tablet by mouth 2 (Two) Times a Day With Meals.    omeprazole (priLOSEC) 20 MG capsule Take 1 capsule by mouth Daily.    ondansetron ODT (ZOFRAN-ODT) 4 MG disintegrating tablet DISSOLVE 1 TABLET BY MOUTH EVERY 8 HOURS AS NEEDED FOR NAUSEA FOR UP TO 7 DAYS.    QUEtiapine (SEROquel) 200 MG tablet     sertraline (ZOLOFT) 100 MG tablet tid    traZODone (DESYREL) 100 MG tablet 2 at bedtime    valsartan (DIOVAN) 80 MG tablet Take 1 tablet by mouth Daily.  "    No current facility-administered medications on file prior to visit.         The following portions of the patient's history were reviewed and updated as appropriate: allergies, current medications, past family history, past medical history, past social history, past surgical history and problem list.    Review of Systems   Constitutional: Negative.   HENT: Negative.  Negative for congestion.    Eyes: Negative.    Cardiovascular: Negative.  Negative for chest pain, cyanosis, dyspnea on exertion, irregular heartbeat, leg swelling, near-syncope, orthopnea, palpitations, paroxysmal nocturnal dyspnea and syncope.   Respiratory: Negative.  Negative for shortness of breath.    Hematologic/Lymphatic: Negative.    Musculoskeletal: Negative.    Gastrointestinal: Negative.    Neurological: Negative.  Negative for headaches.          Objective:     /90 (BP Location: Left arm, Patient Position: Sitting, Cuff Size: Large Adult)   Pulse 77   Ht 168.9 cm (66.5\")   Wt 104 kg (230 lb)   SpO2 98%   BMI 36.57 kg/m²   Pulmonary:      Effort: Pulmonary effort is normal.      Breath sounds: Normal breath sounds. No stridor. No wheezing. No rhonchi. No rales.   Cardiovascular:      PMI at left midclavicular line. Normal rate. Regular rhythm. Normal S1. Normal S2.       Murmurs: There is no murmur.      No gallop.  No click. No rub.   Pulses:     Intact distal pulses.   Edema:     Peripheral edema absent.           Lab Review  Lab Results   Component Value Date     02/27/2024    K 4.3 02/27/2024     02/27/2024    BUN 15 02/27/2024    CREATININE 0.98 02/27/2024    GLUCOSE 86 02/27/2024    CALCIUM 9.4 02/27/2024    ALT 46 (H) 02/27/2024    ALKPHOS 57 02/27/2024    LABIL2 2.2 02/27/2024     No results found for: \"CKTOTAL\"  Lab Results   Component Value Date    WBC 6.7 02/27/2024    HGB 12.4 (L) 02/27/2024    HCT 37.2 (L) 02/27/2024     (L) 02/27/2024     No results found for: \"INR\"  No results found for: " "\"MG\"  Lab Results   Component Value Date    TSH 1.380 02/27/2024     No results found for: \"BNP\"  Lab Results   Component Value Date    CHLPL 194 02/27/2024    TRIG 132 02/27/2024    HDL 44 02/27/2024    VLDL 24 02/27/2024     (H) 02/27/2024     Lab Results   Component Value Date     (H) 02/27/2024     No results found for: \"PROBNP\"            Procedures    Interpretation Summary         A pharmacological stress test was performed using regadenoson without low-level exercise.    Resting EKG showed sinus rhythm at a rate of 87 bpm.  EKG was normal.    ST segment did not show any diagnostic changes.  No significant arrhythmia detected.    Myocardial perfusion imaging indicates a normal myocardial perfusion study with no evidence of ischemia. Impressions are consistent with a low risk study.    Left ventricular ejection fraction is normal (Calculated EF = 57%).    There is no prior study available for comparison.    TID 1.08         Interpretation Summary/echocardiogram 3/27/2024         Normal left ventricular cavity size with mild concentric hypertrophy noted.    Left ventricular systolic function is normal. Left ventricular ejection fraction appears to be 61 - 65%.    Left ventricular diastolic function was normal.    No significant valvular heart disease noted.    Small echo-free space towards the apex noted, it is not noted on apical four-chamber views .  Small pericardial effusion cannot be excluded however this probably represents apical fat pad.    I personally viewed and interpreted the patient's LAB data         Assessment:     1. Preoperative clearance    2. Mixed hyperlipidemia    3. Primary hypertension    4. Obesity (BMI 35.0-39.9 without comorbidity)          Plan:      Crow is 49 years old white male who was seen by me for preop cardiac clearance.    Blood pressure is very well-controlled with current medications were continued.    Hyperlipidemia is uncontrolled LDL is 126.  He is not " taking any medications however he hopes that with weight loss and surgery lipids will be better if not we will consider statin therapy.    Stress test does not show any significant exercise-induced myocardial ischemia.  Echocardiogram shows fat pad but no pericardial effusion.  LV ejection fraction is normal.    Patient is cleared for surgery from cardiac standpoint as low risk.  He is not taking any antiplatelet or anticoagulant therapy.      Thank you for giving me the oppertunity to participate in your patient's cardiac care.    Sincerely,    RAJI Villaseñor M.D. [facility]        No follow-ups on file.

## 2024-05-06 ENCOUNTER — OUTSIDE FACILITY SERVICE (OUTPATIENT)
Dept: BARIATRICS/WEIGHT MGMT | Facility: CLINIC | Age: 49
End: 2024-05-06
Payer: MEDICARE

## 2024-05-06 ENCOUNTER — LAB REQUISITION (OUTPATIENT)
Dept: LAB | Facility: HOSPITAL | Age: 49
End: 2024-05-06
Payer: MEDICARE

## 2024-05-06 DIAGNOSIS — R12 HEARTBURN: ICD-10-CM

## 2024-05-06 PROCEDURE — 88342 IMHCHEM/IMCYTCHM 1ST ANTB: CPT | Performed by: SURGERY

## 2024-05-06 PROCEDURE — 88305 TISSUE EXAM BY PATHOLOGIST: CPT | Performed by: SURGERY

## 2024-05-08 LAB
CYTO UR: NORMAL
LAB AP CASE REPORT: NORMAL
LAB AP CLINICAL INFORMATION: NORMAL
PATH REPORT.FINAL DX SPEC: NORMAL
PATH REPORT.GROSS SPEC: NORMAL

## 2024-05-10 DIAGNOSIS — R12 HEARTBURN: ICD-10-CM

## 2024-06-07 ENCOUNTER — OFFICE VISIT (OUTPATIENT)
Dept: PULMONOLOGY | Facility: CLINIC | Age: 49
End: 2024-06-07
Payer: MEDICARE

## 2024-06-07 VITALS
DIASTOLIC BLOOD PRESSURE: 78 MMHG | HEIGHT: 66 IN | OXYGEN SATURATION: 97 % | TEMPERATURE: 97.3 F | SYSTOLIC BLOOD PRESSURE: 136 MMHG | BODY MASS INDEX: 36.96 KG/M2 | HEART RATE: 82 BPM | WEIGHT: 230 LBS

## 2024-06-07 DIAGNOSIS — Z87.891 FORMER SMOKER: ICD-10-CM

## 2024-06-07 DIAGNOSIS — E66.9 OBESITY (BMI 30-39.9): ICD-10-CM

## 2024-06-07 DIAGNOSIS — J98.4 RESTRICTIVE LUNG DISEASE: Primary | ICD-10-CM

## 2024-06-07 NOTE — PROGRESS NOTES
Subjective      Chief Complaint  Abnormal PFT    Subjective      History of Present Illness  Crow Watson is a 49 y.o. male who presents today to [facility] with past medical history of essential hypertension, hyperlipidemia, prediabetes, hypothyroidism, and former smoker who presents today for Abnormal PFT. This visit is a initial evaluation  appointment.     Abnormal PFT:  Patient reports that he is planning to have bariatric surgery at [facility]. He is needing preoperative pulmonary evaluation prior to the procedure.    He denies any significant shortness of breath, coughing or wheezing. He has a smoking history of 1 ppd for 30 years and achieved cessation in 2019. He recently had a PFT which revealed restriction and reduced DLCO. He denies any history of pulmonary fibrosis. He denies any history of autoimmune diseases.     He denies any issues with sleeping. He denies snoring or excessive daytime sleepiness. He has never been tested for sleep apnea in the past.       Current Outpatient Medications:     allopurinol (ZYLOPRIM) 300 MG tablet, bid, Disp: , Rfl:     amitriptyline (ELAVIL) 50 MG tablet, 1 tablet Daily., Disp: , Rfl: 1    amLODIPine (NORVASC) 5 MG tablet, 1 tablet Daily., Disp: , Rfl: 5    hydrochlorothiazide (HYDRODIURIL) 12.5 MG tablet, 1 tablet Daily., Disp: , Rfl: 2    labetalol (NORMODYNE) 200 MG tablet, Take 1 tablet by mouth 3 (Three) Times a Day., Disp: , Rfl:     levothyroxine (SYNTHROID, LEVOTHROID) 50 MCG tablet, , Disp: , Rfl:     naproxen (NAPROSYN) 500 MG tablet, Take 1 tablet by mouth 2 (Two) Times a Day With Meals., Disp: , Rfl:     omeprazole (priLOSEC) 20 MG capsule, Take 1 capsule by mouth Daily., Disp: , Rfl:     QUEtiapine (SEROquel) 200 MG tablet, , Disp: , Rfl:     sertraline (ZOLOFT) 100 MG tablet, tid, Disp: , Rfl:     traZODone (DESYREL) 100 MG tablet, 2 at bedtime, Disp: , Rfl:     valsartan (DIOVAN) 80  "MG tablet, Take 1 tablet by mouth Daily., Disp: , Rfl:     celecoxib (CeleBREX) 200 MG capsule, TAKE 1 CAPSULE BY MOUTH DAILY **LOOK-ALIKE/SOUND-ALIKE MEDICATION**., Disp: , Rfl:     gabapentin (NEURONTIN) 300 MG capsule, Take 1 capsule by mouth Every Night., Disp: , Rfl:     ondansetron ODT (ZOFRAN-ODT) 4 MG disintegrating tablet, DISSOLVE 1 TABLET BY MOUTH EVERY 8 HOURS AS NEEDED FOR NAUSEA FOR UP TO 7 DAYS., Disp: , Rfl:       Allergies   Allergen Reactions    Morphine And Codeine Other (See Comments)     Migraine, hypertension    Ultram [Tramadol Hcl] Nausea And Vomiting    Zithromax [Azithromycin] Nausea And Vomiting       Objective     Objective   Vital Signs:  /78   Pulse 82   Temp 97.3 °F (36.3 °C)   Ht 168.9 cm (66.5\")   Wt 104 kg (230 lb)   SpO2 97%   BMI 36.57 kg/m²   Estimated body mass index is 36.57 kg/m² as calculated from the following:    Height as of this encounter: 168.9 cm (66.5\").    Weight as of this encounter: 104 kg (230 lb).          Past Medical History:   Diagnosis Date    Anxiety     Atrophy, cortical     white matter disease    Back pain     Naproxen. No steroids    FORD (dyspnea on exertion)     Gout     allopurinol    Heartburn     Omeprazole daily. No hx of h pylori or prior EGD.    Herniated disc, cervical     Hyperlipidemia     Hypertension     Hypothyroidism     synthroid    Kidney stone     Lumbar herniated disc     Memory loss     Migraine     Neuropathy     Prediabetes     Tremor     Wears glasses      Past Surgical History:   Procedure Laterality Date    COLONOSCOPY      SINUS SURGERY      VASECTOMY           Social History     Socioeconomic History    Marital status:    Tobacco Use    Smoking status: Former     Current packs/day: 0.00     Average packs/day: 1 pack/day for 30.0 years (30.0 ttl pk-yrs)     Types: Cigarettes     Start date:      Quit date: 2019     Years since quittin.4     Passive exposure: Past    Smokeless tobacco: Never " "  Vaping Use    Vaping status: Every Day   Substance and Sexual Activity    Alcohol use: Never    Drug use: Never        Physical Exam  Constitutional:       General: He is awake.      Appearance: Normal appearance. He is obese.   HENT:      Head: Normocephalic and atraumatic.      Nose: Nose normal.      Mouth/Throat:      Mouth: Mucous membranes are moist.      Pharynx: Oropharynx is clear.   Eyes:      Conjunctiva/sclera: Conjunctivae normal.      Pupils: Pupils are equal, round, and reactive to light.   Cardiovascular:      Rate and Rhythm: Normal rate and regular rhythm.      Pulses: Normal pulses.      Heart sounds: Normal heart sounds. No murmur heard.     No friction rub. No gallop.   Pulmonary:      Effort: Pulmonary effort is normal. No tachypnea, accessory muscle usage or respiratory distress.      Breath sounds: Normal breath sounds. No decreased breath sounds, wheezing, rhonchi or rales.   Musculoskeletal:         General: Normal range of motion.      Cervical back: Full passive range of motion without pain and normal range of motion.   Skin:     General: Skin is warm and dry.   Neurological:      General: No focal deficit present.      Mental Status: He is alert. Mental status is at baseline.   Psychiatric:         Mood and Affect: Mood normal.         Behavior: Behavior normal. Behavior is cooperative.         Thought Content: Thought content normal.          Result Review :  The following labs and radiology results have been reviewed.    Lab Review:   No results found for: \"FEV1\", \"FVC\", \"LXN1VRS\", \"TLC\", \"DLCO\"  Lab Requisition on 05/06/2024   Component Date Value Ref Range Status    Case Report 05/06/2024    Final                    Value:Surgical Pathology Report                         Case: [id number]                                  Authorizing Provider:  Ron Patel MD    Collected:           05/06/2024 09:40 AM          Ordering Location:     [facility]   Received:      "       05/06/2024 10:50 AM                                 LABORATORY                                                                   Pathologist:           Ron Mayorga MD                                                            Specimens:   1) - Gastric, Antrum                                                                                2) - Esophagus, Distal                                                                              3) - Esophagus, Mid                                                                        Clinical Information 05/06/2024    Final                    Value:This result contains rich text formatting which cannot be displayed here.    Final Diagnosis 05/06/2024    Final                    Value:This result contains rich text formatting which cannot be displayed here.    Gross Description 05/06/2024    Final                    Value:This result contains rich text formatting which cannot be displayed here.    Microscopic Description 05/06/2024    Final                    Value:This result contains rich text formatting which cannot be displayed here.   Hospital Outpatient Visit on 04/24/2024   Component Date Value Ref Range Status    Nuclear Prior Study 04/24/2024 3.0   Final    BH CV REST NUCLEAR ISOTOPE DOSE 04/24/2024 10.2  mCi Final    BH CV STRESS NUCLEAR ISOTOPE DOSE 04/24/2024 29.4  mCi Final    BH CV STRESS PROTOCOL 1 04/24/2024 Pharmacologic   Final    Stage 1 04/24/2024 1.0   Final    HR Stage 1 04/24/2024 97   Final    BP Stage 1 04/24/2024 168/98   Final    Duration Min Stage 1 04/24/2024 0   Final    Duration Sec Stage 1 04/24/2024 10   Final    Stress Dose Regadenoson Stage 1 04/24/2024 0.40   Final    Stress Comments Stage 1 04/24/2024 10 sec bolus injection   Final    Baseline HR 04/24/2024 87  bpm Final    Baseline BP 04/24/2024 169/97  mmHg Final    Peak HR 04/24/2024 97  bpm Final    Peak BP 04/24/2024 168/98  mmHg Final    Recovery HR 04/24/2024 98  bpm Final     Recovery BP 04/24/2024 169/102  mmHg Final    Target HR (85%) 04/24/2024 148  bpm Final    Max. Pred. HR (100%) 04/24/2024 174  bpm Final    Percent Max Pred HR 04/24/2024 55.75  % Final    Percent Target HR 04/24/2024 66  % Final    Nuc Stress EF 04/24/2024 57  % Final   Hospital Outpatient Visit on 03/27/2024   Component Date Value Ref Range Status    Exercise duration (min) 03/27/2024 6  min Final    Estimated workload 03/27/2024 7.0  METS Final    BH CV STRESS PROTOCOL 1 03/27/2024 [place]   Final    Stage 1 03/27/2024 1.0   Final    HR Stage 1 03/27/2024 130   Final    BP Stage 1 03/27/2024 158/86   Final    Duration Min Stage 1 03/27/2024 3   Final    Duration Sec Stage 1 03/27/2024 0   Final    Grade Stage 1 03/27/2024 10   Final    Speed Stage 1 03/27/2024 1.7   Final    BH CV STRESS METS STAGE 1 03/27/2024 5.0   Final    Stage 2 03/27/2024 2.0   Final    HR Stage 2 03/27/2024 148   Final    BP Stage 2 03/27/2024 170/77   Final    Duration Min Stage 2 03/27/2024 3   Final    Duration Sec Stage 2 03/27/2024 0   Final    Grade Stage 2 03/27/2024 12   Final    Speed Stage 2 03/27/2024 2.5   Final    BH CV STRESS METS STAGE 2 03/27/2024 7.5   Final    Baseline HR 03/27/2024 96  bpm Final    Baseline BP 03/27/2024 133/94  mmHg Final    Peak HR 03/27/2024 148  bpm Final    Peak BP 03/27/2024 170/77  mmHg Final    Recovery HR 03/27/2024 99  bpm Final    Recovery BP 03/27/2024 142/88  mmHg Final    Target HR (85%) 03/27/2024 148  bpm Final    Max. Pred. HR (100%) 03/27/2024 174  bpm Final    Percent Max Pred HR 03/27/2024 85.06  % Final    Percent Target HR 03/27/2024 100  % Final   Hospital Outpatient Visit on 03/27/2024   Component Date Value Ref Range Status    LVIDd 03/27/2024 4.8  cm Final    LVIDs 03/27/2024 4.0  cm Final    IVSd 03/27/2024 1.3  cm Final    LVPWd 03/27/2024 1.21  cm Final    FS 03/27/2024 17.4  % Final    IVS/LVPW 03/27/2024 1.28  cm Final    ESV(cubed) 03/27/2024 63.5  ml Final    LV Sys Vol  (BSA corrected) 03/27/2024 13.5  cm2 Final    EDV(cubed) 03/27/2024 112.7  ml Final    LV [place] Vol (BSA corrected) 03/27/2024 28.1  cm2 Final    LV mass(C)d 03/27/2024 270.6  grams Final    LVOT area 03/27/2024 3.8  cm2 Final    LVOT diam 03/27/2024 2.20  cm Final    EDV(MOD-sp4) 03/27/2024 59.6  ml Final    ESV(MOD-sp4) 03/27/2024 28.5  ml Final    SV(MOD-sp4) 03/27/2024 31.1  ml Final    SVi(MOD-SP4) 03/27/2024 14.7  ml/m2 Final    EF(MOD-sp4) 03/27/2024 52.2  % Final    MV E max haider 03/27/2024 83.1  cm/sec Final    MV A max haider 03/27/2024 73.7  cm/sec Final    MV E/A 03/27/2024 1.13   Final    LA ESV Index (BP) 03/27/2024 25.0  ml/m2 Final    Med Peak E' Haider 03/27/2024 7.1  cm/sec Final    Lat Peak E' Haider 03/27/2024 8.4  cm/sec Final    Avg E/e' ratio 03/27/2024 10.72   Final    LA dimension (2D)  03/27/2024 3.3  cm Final    Ao pk haider 03/27/2024 133.0  cm/sec Final    Ao max PG 03/27/2024 7.1  mmHg Final    Ao mean PG 03/27/2024 4.0  mmHg Final    Ao V2 VTI 03/27/2024 25.5  cm Final    PA acc time 03/27/2024 0.07  sec Final    Ao root diam 03/27/2024 2.7  cm Final      Imaging Results (Most Recent)       None          Radiology Review:   Imaging Results (Last 72 Hours)       ** No results found for the last 72 hours. **          Reviewed full PFT from 04/2024      Assessment / Plan         Assessment   Diagnoses and all orders for this visit:    1. Restrictive lung disease (Primary)  2. Obesity (BMI 30-39.9)  3. Former smoker  Crow Watson  reports that he quit smoking about 5 years ago. His smoking use included cigarettes. He started smoking about 35 years ago. He has a 30 pack-year smoking history. He has been exposed to tobacco smoke. He has never used smokeless tobacco.   Recent PFT revealed moderate restrictive lung disease and reduced diffusion capacity.   Will order CT chest without contrast to further evaluate lung parenchyma.   Following additional workup will provide recommendations for upcoming  bariatric procedure in [place].     -     CT Chest Without Contrast; Future     There are no discontinued medications.     No orders of the defined types were placed in this encounter.    I spent 25 minutes caring for Crow on this date of service. This time includes time spent by me in the following activities:preparing for the visit, reviewing tests, obtaining and/or reviewing a separately obtained history, performing a medically appropriate examination and/or evaluation , counseling and educating the patient/family/caregiver, ordering medications, tests, or procedures, referring and communicating with other health care professionals , documenting information in the medical record, independently interpreting results and communicating that information with the patient/family/caregiver, and care coordination    Follow Up   Return if symptoms worsen or fail to improve, for Next scheduled follow up.    Patient was given instructions and counseling regarding his condition or for health maintenance advice. Please see specific information pulled into the AVS if appropriate.       This document has been electronically signed by Yessi Corona PA-C   Mayela 10, 2024 13:01 EDT    Dictated Utilizing Dragon Dictation: Part of this note may be an electronic transcription/translation of spoken language to printed text using the Dragon Dictation System.

## 2024-06-20 ENCOUNTER — HOSPITAL ENCOUNTER (OUTPATIENT)
Facility: HOSPITAL | Age: 49
Discharge: HOME | End: 2024-06-20
Admitting: PHYSICIAN ASSISTANT
Payer: MEDICARE

## 2024-06-20 DIAGNOSIS — J98.4 RESTRICTIVE LUNG DISEASE: ICD-10-CM

## 2024-06-20 PROCEDURE — 71250 CT THORAX DX C-: CPT

## 2024-07-26 ENCOUNTER — DOCUMENTATION (OUTPATIENT)
Dept: PULMONOLOGY | Facility: CLINIC | Age: 49
End: 2024-07-26
Payer: MEDICARE

## 2024-07-26 DIAGNOSIS — R06.00 DYSPNEA, UNSPECIFIED TYPE: ICD-10-CM

## 2024-07-26 DIAGNOSIS — J98.4 RESTRICTIVE LUNG DISEASE: Primary | ICD-10-CM

## 2024-07-26 NOTE — PROGRESS NOTES
Contacted patient to discuss CT imaging results. Patient's wife answered the phone (listed in chart as alternate contact) so results were discussed with her.     CT imaging reveals groudglass changes bilaterally with suspicion of underlying diffuse interstitial disease. Discussed imaging findings with Dr. Lima. Recommends autoimmune lab panel and follow-up HRCT imaging in 3 months. Will schedule patient for 3-month follow-up visit at pulmonology office so that results can be discussed.     Wife reports that patient had COVID-19 around [id number] and has also had bacterial pneumonia's in the past. He has had CT imaging in the past and states that she will try to find out where and contact us back so that records can be requested.     Patient will be having upcoming bariatric surgery in the future. His wife states that he has had surgery in the past and has tolerated the procedures well. He has completed pre-operative evaluation. See recommendations for procedure as noted below:    Preoperative risk assessment:   Patient is planning to have bariatric surgery in the upcoming future. Pulmonary risk assessment requested. Patient is mild to moderate risk for pulmonary complications intra and postoperatively due to possible underlying pulmonary fibrosis/ILD/hypersensitivity pneumonitis and obesity.     If intubation is required:  - pulmonology recommends using sedatives that do not result in histamine release.  - if general anesthesia is needed, consider extubation to BIPAP/CPAP.     For ventilator settings if needed, recommend lung protective strategy with low lung volume:   - tidal volume of 6-8 mL/kg of predicted body weight  - PEEP at 6-8 cm of water, plateau pressure < 30  - frequent checking for Autopeep and leak of ventilator settings    Also recommend:  - early mobilization while in hospital  - DVT prophylaxis  - incentive spirometer use post-operatively to increase lung volumes and prevent basal atelectasis.

## 2024-09-25 ENCOUNTER — LAB (OUTPATIENT)
Dept: LAB | Facility: HOSPITAL | Age: 49
End: 2024-09-25
Payer: MEDICARE

## 2024-09-25 DIAGNOSIS — J98.4 RESTRICTIVE LUNG DISEASE: ICD-10-CM

## 2024-09-25 DIAGNOSIS — R06.00 DYSPNEA, UNSPECIFIED TYPE: ICD-10-CM

## 2024-09-25 PROCEDURE — 86331 IMMUNODIFFUSION OUCHTERLONY: CPT

## 2024-09-25 PROCEDURE — 86602 ANTINOMYCES ANTIBODY: CPT

## 2024-09-25 PROCEDURE — 83516 IMMUNOASSAY NONANTIBODY: CPT

## 2024-09-25 PROCEDURE — 83880 ASSAY OF NATRIURETIC PEPTIDE: CPT

## 2024-09-25 PROCEDURE — 86671 FUNGUS NES ANTIBODY: CPT

## 2024-09-25 PROCEDURE — 86038 ANTINUCLEAR ANTIBODIES: CPT

## 2024-09-25 PROCEDURE — 86431 RHEUMATOID FACTOR QUANT: CPT

## 2024-09-25 PROCEDURE — 86037 ANCA TITER EACH ANTIBODY: CPT

## 2024-09-25 PROCEDURE — 36415 COLL VENOUS BLD VENIPUNCTURE: CPT

## 2024-09-25 PROCEDURE — 86606 ASPERGILLUS ANTIBODY: CPT

## 2024-09-25 PROCEDURE — 86609 BACTERIUM ANTIBODY: CPT

## 2024-09-26 DIAGNOSIS — R53.83 FATIGUE, UNSPECIFIED TYPE: Primary | ICD-10-CM

## 2024-09-26 DIAGNOSIS — R06.00 DYSPNEA, UNSPECIFIED TYPE: ICD-10-CM

## 2024-09-26 LAB
ANA SER QL: NEGATIVE
CHROMATIN AB SERPL-ACNC: <10 IU/ML (ref 0–14)
NT-PROBNP SERPL-MCNC: <36 PG/ML (ref 0–450)

## 2024-09-27 LAB
C-ANCA TITR SER IF: NORMAL TITER
MYELOPEROXIDASE AB SER IA-ACNC: <0.2 UNITS (ref 0–0.9)
P-ANCA ATYPICAL TITR SER IF: NORMAL TITER
P-ANCA TITR SER IF: NORMAL TITER
PROTEINASE3 AB SER IA-ACNC: <0.2 UNITS (ref 0–0.9)

## 2024-10-02 LAB
A FUMIGATUS IGG SER QL: NEGATIVE
A PULLULANS IGG SER QL: NEGATIVE
LACEYELLA SACCHARI AB SER QL ID: NEGATIVE
PIGEON SERUM IGG QL: NEGATIVE
S RECTIVIRGULA IGG SER QL ID: NEGATIVE
T VULGARIS IGG SER QL: NEGATIVE

## 2024-10-04 ENCOUNTER — LAB (OUTPATIENT)
Dept: LAB | Facility: HOSPITAL | Age: 49
End: 2024-10-04
Payer: MEDICARE

## 2024-10-04 DIAGNOSIS — R53.83 FATIGUE, UNSPECIFIED TYPE: ICD-10-CM

## 2024-10-04 DIAGNOSIS — R06.00 DYSPNEA, UNSPECIFIED TYPE: ICD-10-CM

## 2024-10-04 LAB
ALBUMIN SERPL-MCNC: 5 G/DL (ref 3.5–5.2)
ALBUMIN/GLOB SERPL: 2 G/DL
ALP SERPL-CCNC: 59 U/L (ref 39–117)
ALT SERPL W P-5'-P-CCNC: 34 U/L (ref 1–41)
ANION GAP SERPL CALCULATED.3IONS-SCNC: 14.7 MMOL/L (ref 5–15)
AST SERPL-CCNC: 25 U/L (ref 1–40)
BILIRUB SERPL-MCNC: 0.4 MG/DL (ref 0–1.2)
BUN SERPL-MCNC: 17 MG/DL (ref 6–20)
BUN/CREAT SERPL: 12.8 (ref 7–25)
CALCIUM SPEC-SCNC: 9.4 MG/DL (ref 8.6–10.5)
CHLORIDE SERPL-SCNC: 97 MMOL/L (ref 98–107)
CO2 SERPL-SCNC: 26.3 MMOL/L (ref 22–29)
CREAT SERPL-MCNC: 1.33 MG/DL (ref 0.76–1.27)
DEPRECATED RDW RBC AUTO: 43.2 FL (ref 37–54)
EGFRCR SERPLBLD CKD-EPI 2021: 66.3 ML/MIN/1.73
ERYTHROCYTE [DISTWIDTH] IN BLOOD BY AUTOMATED COUNT: 13.2 % (ref 12.3–15.4)
GLOBULIN UR ELPH-MCNC: 2.5 GM/DL
GLUCOSE SERPL-MCNC: 131 MG/DL (ref 65–99)
HCT VFR BLD AUTO: 39.9 % (ref 37.5–51)
HGB BLD-MCNC: 13.9 G/DL (ref 13–17.7)
MCH RBC QN AUTO: 31.4 PG (ref 26.6–33)
MCHC RBC AUTO-ENTMCNC: 34.8 G/DL (ref 31.5–35.7)
MCV RBC AUTO: 90.1 FL (ref 79–97)
PLATELET # BLD AUTO: 137 10*3/MM3 (ref 140–450)
PMV BLD AUTO: 11.3 FL (ref 6–12)
POTASSIUM SERPL-SCNC: 3.8 MMOL/L (ref 3.5–5.2)
PROT SERPL-MCNC: 7.5 G/DL (ref 6–8.5)
RBC # BLD AUTO: 4.43 10*6/MM3 (ref 4.14–5.8)
SODIUM SERPL-SCNC: 138 MMOL/L (ref 136–145)
WBC NRBC COR # BLD AUTO: 5.19 10*3/MM3 (ref 3.4–10.8)

## 2024-10-04 PROCEDURE — 85027 COMPLETE CBC AUTOMATED: CPT

## 2024-10-04 PROCEDURE — 36415 COLL VENOUS BLD VENIPUNCTURE: CPT

## 2024-10-04 PROCEDURE — 80053 COMPREHEN METABOLIC PANEL: CPT

## 2024-10-08 ENCOUNTER — CONSULT (OUTPATIENT)
Dept: BARIATRICS/WEIGHT MGMT | Facility: CLINIC | Age: 49
End: 2024-10-08
Payer: MEDICARE

## 2024-10-08 VITALS
TEMPERATURE: 98 F | DIASTOLIC BLOOD PRESSURE: 84 MMHG | BODY MASS INDEX: 34.61 KG/M2 | RESPIRATION RATE: 20 BRPM | WEIGHT: 220.5 LBS | HEART RATE: 72 BPM | HEIGHT: 67 IN | OXYGEN SATURATION: 98 % | SYSTOLIC BLOOD PRESSURE: 136 MMHG

## 2024-10-08 DIAGNOSIS — E66.01 MORBID EXOGENOUS OBESITY: Primary | ICD-10-CM

## 2024-10-08 PROCEDURE — 99214 OFFICE O/P EST MOD 30 MIN: CPT | Performed by: SURGERY

## 2024-10-08 RX ORDER — HYDROCHLOROTHIAZIDE 25 MG/1
1 TABLET ORAL DAILY
COMMUNITY
Start: 2024-07-24

## 2024-10-08 RX ORDER — ACETAMINOPHEN 500 MG
1000 TABLET ORAL ONCE
OUTPATIENT
Start: 2024-10-08 | End: 2024-10-08

## 2024-10-08 RX ORDER — SODIUM CHLORIDE 0.9 % (FLUSH) 0.9 %
3 SYRINGE (ML) INJECTION EVERY 12 HOURS SCHEDULED
OUTPATIENT
Start: 2024-10-08

## 2024-10-08 RX ORDER — SODIUM CHLORIDE, SODIUM LACTATE, POTASSIUM CHLORIDE, CALCIUM CHLORIDE 600; 310; 30; 20 MG/100ML; MG/100ML; MG/100ML; MG/100ML
150 INJECTION, SOLUTION INTRAVENOUS CONTINUOUS
OUTPATIENT
Start: 2024-10-08 | End: 2024-10-09

## 2024-10-08 RX ORDER — PANTOPRAZOLE SODIUM 40 MG/10ML
40 INJECTION, POWDER, LYOPHILIZED, FOR SOLUTION INTRAVENOUS ONCE
OUTPATIENT
Start: 2024-10-08 | End: 2024-10-08

## 2024-10-08 RX ORDER — GABAPENTIN 100 MG/1
600 CAPSULE ORAL ONCE
OUTPATIENT
Start: 2024-10-08 | End: 2024-10-08

## 2024-10-08 RX ORDER — CHLORHEXIDINE GLUCONATE ORAL RINSE 1.2 MG/ML
30 SOLUTION DENTAL
OUTPATIENT
Start: 2024-10-08 | End: 2024-10-08

## 2024-10-08 RX ORDER — SCOLOPAMINE TRANSDERMAL SYSTEM 1 MG/1
1 PATCH, EXTENDED RELEASE TRANSDERMAL CONTINUOUS
OUTPATIENT
Start: 2024-10-08 | End: 2024-10-11

## 2024-10-08 RX ORDER — SODIUM CHLORIDE 0.9 % (FLUSH) 0.9 %
3-10 SYRINGE (ML) INJECTION AS NEEDED
OUTPATIENT
Start: 2024-10-08

## 2024-10-08 NOTE — PROGRESS NOTES
[facility] GROUP BARIATRIC SURGERY  [address]  [address]  [facility] [id number] [phone]      Patient  Name:  Crow Watson  :  1975      Date of Visit: 10/8/2024    Chief Complaint:  weight gain; unable to maintain weight loss.   Evaluate for possible metabolic and bariatric surgery    History of Present Illness:  Crow Watson is a 49 y.o. male who presents today for evaluation, education and consultation regarding metabolic and bariatric surgery (MBS).  Since last seen 2024 he has lost 10-1/2 pounds.  The patient returns for final visit prior to metabolic and bariatric surgery specifically the sleeve gastrectomy.  Original intake evaluation Magdalene KNAPP PA-C dated 2024 reviewed.  She notes the patient's maximum lifetime weight is 230 pounds and he has heartburn treated with daily Prilosec without prior evaluation no gallbladder symptoms history of hypertension hyperlipidemia dyspnea on exertion prediabetes hypothyroidism gout back pain benign essential tremor migraines and anxiety.      The patient has had issues with morbid obesity for years and only temporary success with non-surgical methods of weight loss.  The patient is seeking LSG to help with the morbid obesity related conditions of abnormal PFTs with moderate restriction and decreased diffusion, anxiety, cortical white matter atrophy and memory loss, back pain, dyspnea exertion, former smoker encouraged nicotine vaping, NSAID/aspirin induced gastric ulcer, gout, heartburn, cervical and lumbar herniated discs, hyperlipidemia, hypertension, hypothyroidism, nephrolithiasis, migraine headaches, neuropathy, prediabetes, thrombocytopenia, and benign essential tremor.    49-year-old disabled gentleman from [facility].  He comes in today with his wife who was present for the evaluation and also with him at informed consent last evening.  They said I did a good friend of theirs sleeve gastrectomy who is doing well  and giving them advice.  They said they found informed consent very helpful.  He continues to vape nicotine products but says he can quit for the requisite 2 weeks prior and 6 weeks after and has quit all nicotine in the past.  They voiced clear understanding that the risk of proceeding with sleeve gastrectomy is prohibitive if he cannot avoid ulcerogenic agents as recommended including tobacco, nicotine, and secondhand smoke 2 weeks prior and 6 weeks after sleeve gastrectomy, NSAIDs and aspirin 1 week prior and 6 weeks after sleeve gastrectomy and steroids 1 month prior and 2 months after sleeve gastrectomy.  Again he says he will comply.  He told me for the first time (didn't share with the MA's or our staff previously) that he also takes Stanback aspirin caffeine powders that he gets from [employer] usually 2 packs daily.  We did discuss that his upper endoscopy showed a large prepyloric ulceration, I showed them the color endoscopic pictures in the chart.  They understand this is likely related to his aspirin and chronic NSAID use possibly exacerbated by his nicotine use.  We went over his very poor dietary habits noted on dietitian evaluation with less than adequate protein intake and high exposure to corn syrup with high volume fast food meals and regular sodas.  His BMI is 35.1 and I suggested he consider GLP 1 medications rather than surgery but he says his insurance will not cover.  His hemoglobin A1c was normal at 5.0.  His wife says he is actually been eating even more poorly than usual this last few weeks to keep his BMI over 35.  He is quite adamant about proceeding with surgery and doing what ever it takes to have it done and have it done safely.  He denies any gallbladder symptoms.  He denies personal or family history of bleeding or clotting disorders.      Past Medical History:   Diagnosis Date    Abnormal PFTs     Moderate restriction, decreased diffusion April 2024    Anxiety     Atrophy, cortical      white matter disease    Back pain     Naproxen. 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 Patel May 2024    Gout     allopurinol    Heartburn     Omeprazole daily.  EGD Dr. Patel May 2024    Herniated disc, cervical     Hyperlipidemia     Hypertension     Hypothyroidism     synthroid    Kidney stone     Lumbar herniated disc     Memory loss     Migraine     Neuropathy     Prediabetes     Thrombocytopenia     Tremor     Wears glasses      Past Surgical History:   Procedure Laterality Date    COLONOSCOPY  2002    SINUS SURGERY  2014    VASECTOMY      2000       Allergies   Allergen Reactions    Morphine And Codeine Other (See Comments)     Migraine, hypertension    Ultram [Tramadol Hcl] Nausea And Vomiting    Zithromax [Azithromycin] Nausea And Vomiting       Current Outpatient Medications:     allopurinol (ZYLOPRIM) 300 MG tablet, bid, Disp: , Rfl:     amitriptyline (ELAVIL) 50 MG tablet, 1 tablet Daily., Disp: , Rfl: 1    amLODIPine (NORVASC) 5 MG tablet, 1 tablet Daily., Disp: , Rfl: 5    hydroCHLOROthiazide 25 MG tablet, Take 1 tablet by mouth Daily., Disp: , Rfl:     labetalol (NORMODYNE) 200 MG tablet, Take 1 tablet by mouth 3 (Three) Times a Day., Disp: , Rfl:     levothyroxine (SYNTHROID, LEVOTHROID) 50 MCG tablet, , Disp: , Rfl:     naproxen (NAPROSYN) 500 MG tablet, Take 1 tablet by mouth 2 (Two) Times a Day With Meals., Disp: , Rfl:     omeprazole (priLOSEC) 20 MG capsule, Take 1 capsule by mouth Daily., Disp: , Rfl:     QUEtiapine (SEROquel) 200 MG tablet, , Disp: , Rfl:     sertraline (ZOLOFT) 100 MG tablet, tid, Disp: , Rfl:     traZODone (DESYREL) 100 MG tablet, 2 at bedtime, Disp: , Rfl:     valsartan (DIOVAN) 80 MG tablet, Take 1 tablet by mouth Daily., Disp: , Rfl:     Aspirin-Salicylamide-Caffeine 650-200-32 MG pack, Take 650 mg by mouth Daily As  Needed (Headaches)., Disp: , Rfl:     Social History     Socioeconomic History    Marital status:    Tobacco Use    Smoking status: Former     Current packs/day: 0.00     Average packs/day: 1 pack/day for 30.0 years (30.0 ttl pk-yrs)     Types: Cigarettes     Start date:      Quit date: 2019     Years since quittin.7     Passive exposure: Past    Smokeless tobacco: Never   Vaping Use    Vaping status: Every Day   Substance and Sexual Activity    Alcohol use: Never    Drug use: Never     Family History   Problem Relation Age of Onset    Heart disease Mother     Hypertension Mother     Obesity Mother     Sleep apnea Mother     Sleep apnea Father     Heart disease Father     Hypertension Father     Diabetes Father     Obesity Father     Obesity Sister     Heart disease Sister     Heart disease Maternal Grandmother     Hypertension Maternal Grandmother     Diabetes Maternal Grandmother     Obesity Maternal Grandmother        Review of Systems   Constitutional:  Positive for fatigue. Negative for chills, diaphoresis, fever and unexpected weight loss.   HENT:  Negative for congestion and facial swelling.    Eyes:  Negative for blurred vision, double vision and discharge.   Respiratory:  Negative for chest tightness, shortness of breath and stridor.    Cardiovascular:  Negative for chest pain, palpitations and leg swelling.   Gastrointestinal:  Positive for GERD. Negative for blood in stool.   Endocrine: Negative for polydipsia.   Genitourinary:  Negative for hematuria.   Musculoskeletal:  Positive for arthralgias, back pain and neck pain.   Skin:  Negative for color change.   Allergic/Immunologic: Negative for immunocompromised state.   Neurological:  Positive for tremors. Negative for confusion.   Psychiatric/Behavioral:  Negative for self-injury.        I have reviewed the ROS and confirm that it's accurate today.    Physical Exam:  Vital Signs:  Weight: 100 kg (220 lb 8 oz)   Body mass index is 35.06  kg/m².  Temp: 98 °F (36.7 °C)   Heart Rate: 72   BP: 136/84     Physical Exam  Vitals reviewed.   Constitutional:       Appearance: He is well-developed.   HENT:      Head: Normocephalic and atraumatic.      Nose: Nose normal.   Eyes:      Conjunctiva/sclera: Conjunctivae normal.      Pupils: Pupils are equal, round, and reactive to light.   Neck:      Thyroid: No thyromegaly.      Vascular: No carotid bruit.      Trachea: No tracheal deviation.   Cardiovascular:      Rate and Rhythm: Normal rate and regular rhythm.      Heart sounds: Normal heart sounds.   Pulmonary:      Effort: Pulmonary effort is normal. No respiratory distress.      Breath sounds: Normal breath sounds.   Abdominal:      General: There is no distension.      Palpations: Abdomen is soft.      Tenderness: There is no abdominal tenderness.   Musculoskeletal:         General: No deformity. Normal range of motion.      Cervical back: Normal range of motion and neck supple.   Skin:     General: Skin is warm and dry.      Findings: No rash.      Comments: Tattoos   Neurological:      Mental Status: He is alert and oriented to person, place, and time.      Cranial Nerves: No cranial nerve deficit.      Motor: Tremor present.      Coordination: Coordination normal.   Psychiatric:         Behavior: Behavior normal.         Thought Content: Thought content normal.         Judgment: Judgment normal.         Patient Active Problem List   Diagnosis    Hypertension    Hyperlipidemia    Back pain    Heartburn    Hypothyroidism    Gout    FORD (dyspnea on exertion)    Anxiety    Migraine    Prediabetes    Tremor    Preoperative clearance    Obesity (BMI 35.0-39.9 without comorbidity)    Morbid exogenous obesity       Assessment:    Crow Watson is a 49 y.o. year old male with medically complicated obesity.    Metabolic and bariatric surgery is deemed medically necessary given the following obesity related comorbidities including  abnormal PFTs with moderate  restriction and decreased diffusion, anxiety, cortical white matter atrophy and memory loss, back pain, dyspnea exertion, former smoker encouraged nicotine vaping, NSAID/aspirin induced gastric ulcer, gout, heartburn, cervical and lumbar herniated discs, hyperlipidemia, hypertension, hypothyroidism, nephrolithiasis, migraine headaches, neuropathy, prediabetes, thrombocytopenia, and benign essential tremor with current Weight: 100 kg (220 lb 8 oz) and Body mass index is 35.06 kg/m²..    Encounter Diagnosis   Name Primary?    Morbid exogenous obesity Yes      Patient is aware that surgery is a tool, and that weight loss and improvement in comorbidities is not guaranteed but only seen in the context of appropriate use, follow up and physical activity.    The patient was present for an approximately a 2.5 hour discussion of the purpose of MBS, how MBS is a tool to assist in achieving weight loss goals, the most common complications and how best to avoid them, and the strategies for short and long term weight loss and improvement in comorbidities.  Ample opportunity to discuss questions was available both in group and during the time of individual examination.    I reviewed his Pola report which is negative.  Labs dated 10/4/2024 unremarkable CMP except for glucose of 131 creatinine 1.33 chloride 97 of note GFR normal at 66.3 unremarkable CBC except for platelet count of 137 of note hemoglobin 13.9.  EGD Dr. Patel dated 5/6/2024 showing an irregular appearing mucosal region of the distal antrum with tenacious whitish exudate of unclear etiology in retrospect large superficial ulcer, several biopsies taken no hiatal hernia Z-line roughly 44 cm.  I noted the patient reported previous upper endoscopy about 5 years ago that was unremarkable denies sleep apnea has a history of white matter disease with atrophy chronic back pain on NSAIDs memory loss neuropathy prediabetes.  Antrum biopsies showed mild chronic gastritis  negative for H. pylori distal esophageal biopsy showed reactive changes and mid esophageal biopsy showed reactive changes.  Pulmonary function test dated 4/5/2024 read by Mulugeta Wei MD show a moderate restrictive pattern with reduced diffusion capacity concern for underlying interstitial lung disease consider high-resolution CT scan for further evaluation psychosocial evaluation dated 2/27/2024 Karmen JESIKA PhD good candidate.  I do not see her discussing any issues with his memory loss or nicotine issues.  Dietitian evaluation dated 2/29/2024 Asha CARMICHAEL RD noting that protein intake is marginal to too low to ensure successful weight loss that intake of processed and simple carbohydrates is high in the form of potatoes biscuits bread sweet snacks and regular soda that he has a variable exposure to fruits and vegetables reliance on fast food includes white [place] 12 burgers [employer] 2 roche baconators, Burger [employer] two double whoppers fast food triple burgers with cheese and fries Chinese buffet and that his ingestion of sweet beverages and fruit juice 1 can of Mountain Dew and yair Almaguer daily.  When discussed with the patient the wife said she filled it out and they agreed it is usually only 1 baconator or double whopper, not two.  Labs dated 2/27/2024 negative H. pylori breath test CBC showing normal white count anemia normocytic with a hemoglobin of 12.4 hematocrit 37.2 platelet count low at 145 unremarkable CMP except for an ALT of 46 at that time creatinine was 0.98 and GFR was 96.  Hemoglobin A1c 5.0 unremarkable lipid panel except for an LDL of 126 normal TSH pulmonary function test dated 4/5/2024 after bronchodilators FVC 70 FEV1 76 DLCO 63 the only significant improvement after bronchodilators was in PEF at 34%.  Pulmonary clearance dated 7/26/2024 Yessi MOYA PA-C noting the patient is mild to moderate risk for pulmonary complications due to possible underlying pulmonary fibrosis/ILD/hypersensitivity  "pneumonitis and obesity cardiology clearance dated 4/30/2024 [facility] where she Charleen SANCHEZ noting pharmacologic stress test was unremarkable and echocardiogram dated 3/27/2024 unremarkable as well.  Of note ejection fraction 61 to 65% normal systolic and diastolic function no significant valvular disease he cleared the patient at low risk.  Please see scanned records that I have reviewed and signed off on today.  All of this in addition to the patient's unique history and exam has been taken into consideration in determining their appropriate candidacy for MBS.    Complications  of laparoscopic/possible robotic gastric sleeve were discussed. The patient is well aware of the potential complications of surgery that include but not limited to bleeding, infections, deep venous thrombosis, pulmonary embolism, pulmonary complications such as pneumonia, cardiac events, hernias, small bowel obstruction, damage to the spleen or other organs, bowel injury, disfiguring scars, failure to lose weight, need for additional surgery, conversion to an open procedure, and death. Patient is also aware of complications which apply in this particular procedure that can include but are not limited to a \"leak\" at the staple line which in some instances may require conversion to gastric bypass.    The patient is aware if a hiatal hernia is encountered, it likely will be repaired.  R/B/A Rx to hiatal hernia repair were discussed as outlined in our long consent form.  Briefly risks in addition to those for LSG include recurrent hernia, ERIS, dysphagia, esophageal injury, pneumothorax, injury to the vagus nerves, injury to the thoracic duct, aorta or vena cava.    I discussed avoiding all tobacco products, nicotine,  and second hand smoke at least 2 weeks pre-operatively and 6 weeks post-operatively to minimize the risk of sleeve leak.  This included discussing the importance of avoiding even secondhand smoke as the risk of leak is increased.  " Examples discussed:  Avoid going in a house or riding in a car where someone has previously smoked in the last 2 weeks and for 6 weeks postoperatively.  Avoid living in a house where someone smokes (even if it's in a separate room/patio/attached garage, etc.).   Avoid congregating with a group of people who are smoking even if it's outside.  It is OK to be around wood burning fires and barbecue.  I explained that I do not know if marijuana has a same effects but my overall recommendation is to avoid it for 2 weeks prior in 6 weeks after surgery.     Discussed the risks, benefits and alternative therapies at great length as outlined in our extensive consent forms, consent videos, and educational teaching process under the direction of the center's .    A copy of the patient's signed informed consent is on file.    R/B/A Rx discussed to postop anticoagulation incl but not limited to bleeding, drug reaction, venothromboembolic events, etc. and the patient declined.        Plan:    After evaluation today I think the patient is a reasonable candidate for laparoscopic sleeve gastrectomy and EGD.  Wait at least 2 weeks to schedule his surgery so he can be nicotine free for the requisite time.  Also given his large prepyloric ulceration recommended discontinuing his Naprosyn and Stanback (ASA) until surgery as well and continue his daily PPI.   Concern with his ability to avoid short-term complications and have long-term success given his memory issues and exceptionally poor dietary habits noted on dietitian evaluation as above.    Other issues include  abnormal PFTs with moderate restriction and decreased diffusion, anxiety, cortical white matter atrophy and memory loss, back pain, dyspnea exertion, former smoker encouraged nicotine vaping, NSAID/aspirin induced gastric ulcer, gout, heartburn, cervical and lumbar herniated discs, hyperlipidemia, hypertension, hypothyroidism, nephrolithiasis, migraine  headaches, neuropathy, prediabetes, thrombocytopenia, and benign essential tremor    Thank you Dr. Gutierrez for the opportunity evaluate Mr. Watson.      Ron Patel MD

## 2024-10-08 NOTE — LETTER
2024     Samuel Duane Kreis, MD  [address] [place] [address]    Patient: Crow Watson   YOB: 1975   Date of Visit: 10/8/2024     Dear Samuel Duane Kreis, MD:       Thank you for referring Crow Watson to me for evaluation. Below are the relevant portions of my assessment and plan of care.    If you have questions, please do not hesitate to call me. I look forward to following Crow along with you.         Sincerely,        Ron Patel MD        CC: No Recipients    Ron Patel MD  10/14/24 0946  Sign when Signing Visit  [facility] BARIATRIC SURGERY  [address]  [address]  [facility] [address] [phone]      Patient  Name:  Crow Watson  :  1975      Date of Visit: 10/8/2024    Chief Complaint:  weight gain; unable to maintain weight loss.   Evaluate for possible metabolic and bariatric surgery    History of Present Illness:  Crow Watson is a 49 y.o. male who presents today for evaluation, education and consultation regarding metabolic and bariatric surgery (MBS).  Since last seen 2024 he has lost 10-1/2 pounds.  The patient returns for final visit prior to metabolic and bariatric surgery specifically the sleeve gastrectomy.  Original intake evaluation Magdalene KNAPP PA-C dated 2024 reviewed.  She notes the patient's maximum lifetime weight is 230 pounds and he has heartburn treated with daily Prilosec without prior evaluation no gallbladder symptoms history of hypertension hyperlipidemia dyspnea on exertion prediabetes hypothyroidism gout back pain benign essential tremor migraines and anxiety.      The patient has had issues with morbid obesity for years and only temporary success with non-surgical methods of weight loss.  The patient is seeking LSG to help with the morbid obesity related conditions of abnormal PFTs with moderate restriction and decreased diffusion, anxiety, cortical white matter atrophy and memory loss,  back pain, dyspnea exertion, former smoker encouraged nicotine vaping, NSAID/aspirin induced gastric ulcer, gout, heartburn, cervical and lumbar herniated discs, hyperlipidemia, hypertension, hypothyroidism, nephrolithiasis, migraine headaches, neuropathy, prediabetes, thrombocytopenia, and benign essential tremor.    49-year-old disabled gentleman from [facility].  He comes in today with his wife who was present for the evaluation and also with him at informed consent last evening.  They said I did a good friend of theirs sleeve gastrectomy who is doing well and giving them advice.  They said they found informed consent very helpful.  He continues to vape nicotine products but says he can quit for the requisite 2 weeks prior and 6 weeks after and has quit all nicotine in the past.  They voiced clear understanding that the risk of proceeding with sleeve gastrectomy is prohibitive if he cannot avoid ulcerogenic agents as recommended including tobacco, nicotine, and secondhand smoke 2 weeks prior and 6 weeks after sleeve gastrectomy, NSAIDs and aspirin 1 week prior and 6 weeks after sleeve gastrectomy and steroids 1 month prior and 2 months after sleeve gastrectomy.  Again he says he will comply.  He told me for the first time (didn't share with the MA's or our staff previously) that he also takes Stanback aspirin caffeine powders that he gets from [employer] usually 2 packs daily.  We did discuss that his upper endoscopy showed a large prepyloric ulceration, I showed them the color endoscopic pictures in the chart.  They understand this is likely related to his aspirin and chronic NSAID use possibly exacerbated by his nicotine use.  We went over his very poor dietary habits noted on dietitian evaluation with less than adequate protein intake and high exposure to corn syrup with high volume fast food meals and regular sodas.  His BMI is 35.1 and I suggested he consider GLP 1 medications rather than surgery  but he says his insurance will not cover.  His hemoglobin A1c was normal at 5.0.  His wife says he is actually been eating even more poorly than usual this last few weeks to keep his BMI over 35.  He is quite adamant about proceeding with surgery and doing what ever it takes to have it done and have it done safely.  He denies any gallbladder symptoms.  He denies personal or family history of bleeding or clotting disorders.      Past Medical History:   Diagnosis Date   • Abnormal PFTs     Moderate restriction, decreased diffusion April 2024   • Anxiety    • Atrophy, cortical     white matter disease   • Back pain     Naproxen. No steroids   • Current every day nicotine vaping    • FORD (dyspnea on exertion)    • Former smoker    • Gastric ulcer due to chemical     Prepyloric large NSAID and aspirin induced ulcer EGD Dr. Patel May 2024   • Gout     allopurinol   • Heartburn     Omeprazole daily.  EGD Dr. Patel May 2024   • Herniated disc, cervical    • Hyperlipidemia    • Hypertension    • Hypothyroidism     synthroid   • Kidney stone    • Lumbar herniated disc    • Memory loss    • Migraine    • Neuropathy    • Prediabetes    • Thrombocytopenia    • Tremor    • Wears glasses      Past Surgical History:   Procedure Laterality Date   • COLONOSCOPY  2002   • SINUS SURGERY  2014   • VASECTOMY      2000       Allergies   Allergen Reactions   • Morphine And Codeine Other (See Comments)     Migraine, hypertension   • Ultram [Tramadol Hcl] Nausea And Vomiting   • Zithromax [Azithromycin] Nausea And Vomiting       Current Outpatient Medications:   •  allopurinol (ZYLOPRIM) 300 MG tablet, bid, Disp: , Rfl:   •  amitriptyline (ELAVIL) 50 MG tablet, 1 tablet Daily., Disp: , Rfl: 1  •  amLODIPine (NORVASC) 5 MG tablet, 1 tablet Daily., Disp: , Rfl: 5  •  hydroCHLOROthiazide 25 MG tablet, Take 1 tablet by mouth Daily., Disp: , Rfl:   •  labetalol (NORMODYNE) 200 MG tablet, Take 1 tablet by mouth 3 (Three) Times a Day., Disp: , Rfl:    •  levothyroxine (SYNTHROID, LEVOTHROID) 50 MCG tablet, , Disp: , Rfl:   •  naproxen (NAPROSYN) 500 MG tablet, Take 1 tablet by mouth 2 (Two) Times a Day With Meals., Disp: , Rfl:   •  omeprazole (priLOSEC) 20 MG capsule, Take 1 capsule by mouth Daily., Disp: , Rfl:   •  QUEtiapine (SEROquel) 200 MG tablet, , Disp: , Rfl:   •  sertraline (ZOLOFT) 100 MG tablet, tid, Disp: , Rfl:   •  traZODone (DESYREL) 100 MG tablet, 2 at bedtime, Disp: , Rfl:   •  valsartan (DIOVAN) 80 MG tablet, Take 1 tablet by mouth Daily., Disp: , Rfl:   •  Aspirin-Salicylamide-Caffeine 650-200-32 MG pack, Take 650 mg by mouth Daily As Needed (Headaches)., Disp: , Rfl:     Social History     Socioeconomic History   • Marital status:    Tobacco Use   • Smoking status: Former     Current packs/day: 0.00     Average packs/day: 1 pack/day for 30.0 years (30.0 ttl pk-yrs)     Types: Cigarettes     Start date:      Quit date: 2019     Years since quittin.7     Passive exposure: Past   • Smokeless tobacco: Never   Vaping Use   • Vaping status: Every Day   Substance and Sexual Activity   • Alcohol use: Never   • Drug use: Never     Family History   Problem Relation Age of Onset   • Heart disease Mother    • Hypertension Mother    • Obesity Mother    • Sleep apnea Mother    • Sleep apnea Father    • Heart disease Father    • Hypertension Father    • Diabetes Father    • Obesity Father    • Obesity Sister    • Heart disease Sister    • Heart disease Maternal Grandmother    • Hypertension Maternal Grandmother    • Diabetes Maternal Grandmother    • Obesity Maternal Grandmother        Review of Systems   Constitutional:  Positive for fatigue. Negative for chills, diaphoresis, fever and unexpected weight loss.   HENT:  Negative for congestion and facial swelling.    Eyes:  Negative for blurred vision, double vision and discharge.   Respiratory:  Negative for chest tightness, shortness of breath and stridor.    Cardiovascular:  Negative for  chest pain, palpitations and leg swelling.   Gastrointestinal:  Positive for GERD. Negative for blood in stool.   Endocrine: Negative for polydipsia.   Genitourinary:  Negative for hematuria.   Musculoskeletal:  Positive for arthralgias, back pain and neck pain.   Skin:  Negative for color change.   Allergic/Immunologic: Negative for immunocompromised state.   Neurological:  Positive for tremors. Negative for confusion.   Psychiatric/Behavioral:  Negative for self-injury.        I have reviewed the ROS and confirm that it's accurate today.    Physical Exam:  Vital Signs:  Weight: 100 kg (220 lb 8 oz)   Body mass index is 35.06 kg/m².  Temp: 98 °F (36.7 °C)   Heart Rate: 72   BP: 136/84     Physical Exam  Vitals reviewed.   Constitutional:       Appearance: He is well-developed.   HENT:      Head: Normocephalic and atraumatic.      Nose: Nose normal.   Eyes:      Conjunctiva/sclera: Conjunctivae normal.      Pupils: Pupils are equal, round, and reactive to light.   Neck:      Thyroid: No thyromegaly.      Vascular: No carotid bruit.      Trachea: No tracheal deviation.   Cardiovascular:      Rate and Rhythm: Normal rate and regular rhythm.      Heart sounds: Normal heart sounds.   Pulmonary:      Effort: Pulmonary effort is normal. No respiratory distress.      Breath sounds: Normal breath sounds.   Abdominal:      General: There is no distension.      Palpations: Abdomen is soft.      Tenderness: There is no abdominal tenderness.   Musculoskeletal:         General: No deformity. Normal range of motion.      Cervical back: Normal range of motion and neck supple.   Skin:     General: Skin is warm and dry.      Findings: No rash.      Comments: Tattoos   Neurological:      Mental Status: He is alert and oriented to person, place, and time.      Cranial Nerves: No cranial nerve deficit.      Motor: Tremor present.      Coordination: Coordination normal.   Psychiatric:         Behavior: Behavior normal.         Thought  Content: Thought content normal.         Judgment: Judgment normal.         Patient Active Problem List   Diagnosis   • Hypertension   • Hyperlipidemia   • Back pain   • Heartburn   • Hypothyroidism   • Gout   • FORD (dyspnea on exertion)   • Anxiety   • Migraine   • Prediabetes   • Tremor   • Preoperative clearance   • Obesity (BMI 35.0-39.9 without comorbidity)   • Morbid exogenous obesity       Assessment:    Crow Watson is a 49 y.o. year old male with medically complicated obesity.    Metabolic and bariatric surgery is deemed medically necessary given the following obesity related comorbidities including  abnormal PFTs with moderate restriction and decreased diffusion, anxiety, cortical white matter atrophy and memory loss, back pain, dyspnea exertion, former smoker encouraged nicotine vaping, NSAID/aspirin induced gastric ulcer, gout, heartburn, cervical and lumbar herniated discs, hyperlipidemia, hypertension, hypothyroidism, nephrolithiasis, migraine headaches, neuropathy, prediabetes, thrombocytopenia, and benign essential tremor with current Weight: 100 kg (220 lb 8 oz) and Body mass index is 35.06 kg/m²..    Encounter Diagnosis   Name Primary?   • Morbid exogenous obesity Yes      Patient is aware that surgery is a tool, and that weight loss and improvement in comorbidities is not guaranteed but only seen in the context of appropriate use, follow up and physical activity.    The patient was present for an approximately a 2.5 hour discussion of the purpose of MBS, how MBS is a tool to assist in achieving weight loss goals, the most common complications and how best to avoid them, and the strategies for short and long term weight loss and improvement in comorbidities.  Ample opportunity to discuss questions was available both in group and during the time of individual examination.    I reviewed his Pola report which is negative.  Labs dated 10/4/2024 unremarkable CMP except for glucose of 131 creatinine  1.33 chloride 97 of note GFR normal at 66.3 unremarkable CBC except for platelet count of 137 of note hemoglobin 13.9.  EGD Dr. Patel dated 5/6/2024 showing an irregular appearing mucosal region of the distal antrum with tenacious whitish exudate of unclear etiology in retrospect large superficial ulcer, several biopsies taken no hiatal hernia Z-line roughly 44 cm.  I noted the patient reported previous upper endoscopy about 5 years ago that was unremarkable denies sleep apnea has a history of white matter disease with atrophy chronic back pain on NSAIDs memory loss neuropathy prediabetes.  Antrum biopsies showed mild chronic gastritis negative for H. pylori distal esophageal biopsy showed reactive changes and mid esophageal biopsy showed reactive changes.  Pulmonary function test dated 4/5/2024 read by Mulugeta Wei MD show a moderate restrictive pattern with reduced diffusion capacity concern for underlying interstitial lung disease consider high-resolution CT scan for further evaluation psychosocial evaluation dated 2/27/2024 Karmen CANCHOLA PhD good candidate.  I do not see her discussing any issues with his memory loss or nicotine issues.  Dietitian evaluation dated 2/29/2024 Asha CARMICHAEL RD noting that protein intake is marginal to too low to ensure successful weight loss that intake of processed and simple carbohydrates is high in the form of potatoes biscuits bread sweet snacks and regular soda that he has a variable exposure to fruits and vegetables reliance on fast food includes white Emeli 12 burjg [employer] 2 roche baconators, Burger Flaco two double whoppers fast food triple burgers with cheese and fries Chinese buffet and that his ingestion of sweet beverages and fruit juice 1 can of Mountain Dew and yair Almaguer daily.  When discussed with the patient the wife said she filled it out and they agreed it is usually only 1 baconator or double whopper, not two.  Labs dated 2/27/2024 negative H. pylori breath  "test CBC showing normal white count anemia normocytic with a hemoglobin of 12.4 hematocrit 37.2 platelet count low at 145 unremarkable CMP except for an ALT of 46 at that time creatinine was 0.98 and GFR was 96.  Hemoglobin A1c 5.0 unremarkable lipid panel except for an LDL of 126 normal TSH pulmonary function test dated 4/5/2024 after bronchodilators FVC 70 FEV1 76 DLCO 63 the only significant improvement after bronchodilators was in PEF at 34%.  Pulmonary clearance dated 7/26/2024 Yessi MOYA PA-C noting the patient is mild to moderate risk for pulmonary complications due to possible underlying pulmonary fibrosis/ILD/hypersensitivity pneumonitis and obesity cardiology clearance dated 4/30/2024 [facility] where she Charleen SANCHEZ noting pharmacologic stress test was unremarkable and echocardiogram dated 3/27/2024 unremarkable as well.  Of note ejection fraction 61 to 65% normal systolic and diastolic function no significant valvular disease he cleared the patient at low risk.  Please see scanned records that I have reviewed and signed off on today.  All of this in addition to the patient's unique history and exam has been taken into consideration in determining their appropriate candidacy for MBS.    Complications  of laparoscopic/possible robotic gastric sleeve were discussed. The patient is well aware of the potential complications of surgery that include but not limited to bleeding, infections, deep venous thrombosis, pulmonary embolism, pulmonary complications such as pneumonia, cardiac events, hernias, small bowel obstruction, damage to the spleen or other organs, bowel injury, disfiguring scars, failure to lose weight, need for additional surgery, conversion to an open procedure, and death. Patient is also aware of complications which apply in this particular procedure that can include but are not limited to a \"leak\" at the staple line which in some instances may require conversion to gastric bypass.    The patient " is aware if a hiatal hernia is encountered, it likely will be repaired.  R/B/A Rx to hiatal hernia repair were discussed as outlined in our long consent form.  Briefly risks in addition to those for LSG include recurrent hernia, ERIS, dysphagia, esophageal injury, pneumothorax, injury to the vagus nerves, injury to the thoracic duct, aorta or vena cava.    I discussed avoiding all tobacco products, nicotine,  and second hand smoke at least 2 weeks pre-operatively and 6 weeks post-operatively to minimize the risk of sleeve leak.  This included discussing the importance of avoiding even secondhand smoke as the risk of leak is increased.  Examples discussed:  Avoid going in a house or riding in a car where someone has previously smoked in the last 2 weeks and for 6 weeks postoperatively.  Avoid living in a house where someone smokes (even if it's in a separate room/patio/attached garage, etc.).   Avoid congregating with a group of people who are smoking even if it's outside.  It is OK to be around wood burning fires and barbecue.  I explained that I do not know if marijuana has a same effects but my overall recommendation is to avoid it for 2 weeks prior in 6 weeks after surgery.     Discussed the risks, benefits and alternative therapies at great length as outlined in our extensive consent forms, consent videos, and educational teaching process under the direction of the center's .    A copy of the patient's signed informed consent is on file.    R/B/A Rx discussed to postop anticoagulation incl but not limited to bleeding, drug reaction, venothromboembolic events, etc. and the patient declined.        Plan:    After evaluation today I think the patient is a reasonable candidate for laparoscopic sleeve gastrectomy and EGD.  Wait at least 2 weeks to schedule his surgery so he can be nicotine free for the requisite time.  Also given his large prepyloric ulceration recommended discontinuing his Naprosyn  and Stanback (ASA) until surgery as well and continue his daily PPI.   Concern with his ability to avoid short-term complications and have long-term success given his memory issues and exceptionally poor dietary habits noted on dietitian evaluation as above.    Other issues include  abnormal PFTs with moderate restriction and decreased diffusion, anxiety, cortical white matter atrophy and memory loss, back pain, dyspnea exertion, former smoker encouraged nicotine vaping, NSAID/aspirin induced gastric ulcer, gout, heartburn, cervical and lumbar herniated discs, hyperlipidemia, hypertension, hypothyroidism, nephrolithiasis, migraine headaches, neuropathy, prediabetes, thrombocytopenia, and benign essential tremor    Thank you Dr. Gutierrez for the opportunity evaluate Mr. Watson.      Ron Patel MD

## 2024-10-08 NOTE — H&P (VIEW-ONLY)
[facility] GROUP BARIATRIC SURGERY  [address]  [address]  [facility] [id number] [phone]      Patient  Name:  Crow Watson  :  1975      Date of Visit: 10/8/2024    Chief Complaint:  weight gain; unable to maintain weight loss.   Evaluate for possible metabolic and bariatric surgery    History of Present Illness:  Crow Watson is a 49 y.o. male who presents today for evaluation, education and consultation regarding metabolic and bariatric surgery (MBS).  Since last seen 2024 he has lost 10-1/2 pounds.  The patient returns for final visit prior to metabolic and bariatric surgery specifically the sleeve gastrectomy.  Original intake evaluation Magdalene KNAPP PA-C dated 2024 reviewed.  She notes the patient's maximum lifetime weight is 230 pounds and he has heartburn treated with daily Prilosec without prior evaluation no gallbladder symptoms history of hypertension hyperlipidemia dyspnea on exertion prediabetes hypothyroidism gout back pain benign essential tremor migraines and anxiety.      The patient has had issues with morbid obesity for years and only temporary success with non-surgical methods of weight loss.  The patient is seeking LSG to help with the morbid obesity related conditions of abnormal PFTs with moderate restriction and decreased diffusion, anxiety, cortical white matter atrophy and memory loss, back pain, dyspnea exertion, former smoker current nicotine vaping, NSAID/aspirin induced gastric ulcer, gout, heartburn, cervical and lumbar herniated discs, hyperlipidemia, hypertension, hypothyroidism, nephrolithiasis, migraine headaches, neuropathy, prediabetes, thrombocytopenia, and benign essential tremor.    49-year-old disabled gentleman from [facility].  He comes in today with his wife who was present for the evaluation and also with him at informed consent last evening.  They said I did a good friend of theirs sleeve gastrectomy who is doing well and  giving them advice.  They said they found informed consent very helpful.  He continues to vape nicotine products but says he can quit for the requisite 2 weeks prior and 6 weeks after and has quit all nicotine in the past.  They voiced clear understanding that the risk of proceeding with sleeve gastrectomy is prohibitive if he cannot avoid ulcerogenic agents as recommended including tobacco, nicotine, and secondhand smoke 2 weeks prior and 6 weeks after sleeve gastrectomy, NSAIDs and aspirin 1 week prior and 6 weeks after sleeve gastrectomy and steroids 1 month prior and 2 months after sleeve gastrectomy.  Again he says he will comply.  He told me for the first time (didn't share with the MA's or our staff previously) that he also takes Stanback aspirin caffeine powders that he gets from [employer] usually 2 packs daily.  We did discuss that his upper endoscopy showed a large prepyloric ulceration, I showed them the color endoscopic pictures in the chart.  They understand this is likely related to his aspirin and chronic NSAID use possibly exacerbated by his nicotine use.  We went over his very poor dietary habits noted on dietitian evaluation with less than adequate protein intake and high exposure to corn syrup with high volume fast food meals and regular sodas.  His BMI is 35.1 and I suggested he consider GLP 1 medications rather than surgery but he says his insurance will not cover.  His hemoglobin A1c was normal at 5.0.  His wife says he is actually been eating even more poorly than usual this last few weeks to keep his BMI over 35.  He is quite adamant about proceeding with surgery and doing what ever it takes to have it done and have it done safely.  He denies any gallbladder symptoms.  He denies personal or family history of bleeding or clotting disorders.      Past Medical History:   Diagnosis Date    Abnormal PFTs     Moderate restriction, decreased diffusion April 2024    Anxiety     Atrophy, cortical      white matter disease    Back pain     Naproxen. 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 Patel May 2024    Gout     allopurinol    Heartburn     Omeprazole daily.  EGD Dr. Patel May 2024    Herniated disc, cervical     Hyperlipidemia     Hypertension     Hypothyroidism     synthroid    Kidney stone     Lumbar herniated disc     Memory loss     Migraine     Neuropathy     Prediabetes     Thrombocytopenia     Tremor     Wears glasses      Past Surgical History:   Procedure Laterality Date    COLONOSCOPY  2002    SINUS SURGERY  2014    VASECTOMY      2000       Allergies   Allergen Reactions    Morphine And Codeine Other (See Comments)     Migraine, hypertension    Ultram [Tramadol Hcl] Nausea And Vomiting    Zithromax [Azithromycin] Nausea And Vomiting       Current Outpatient Medications:     allopurinol (ZYLOPRIM) 300 MG tablet, bid, Disp: , Rfl:     amitriptyline (ELAVIL) 50 MG tablet, 1 tablet Daily., Disp: , Rfl: 1    amLODIPine (NORVASC) 5 MG tablet, 1 tablet Daily., Disp: , Rfl: 5    hydroCHLOROthiazide 25 MG tablet, Take 1 tablet by mouth Daily., Disp: , Rfl:     labetalol (NORMODYNE) 200 MG tablet, Take 1 tablet by mouth 3 (Three) Times a Day., Disp: , Rfl:     levothyroxine (SYNTHROID, LEVOTHROID) 50 MCG tablet, , Disp: , Rfl:     naproxen (NAPROSYN) 500 MG tablet, Take 1 tablet by mouth 2 (Two) Times a Day With Meals., Disp: , Rfl:     omeprazole (priLOSEC) 20 MG capsule, Take 1 capsule by mouth Daily., Disp: , Rfl:     QUEtiapine (SEROquel) 200 MG tablet, , Disp: , Rfl:     sertraline (ZOLOFT) 100 MG tablet, tid, Disp: , Rfl:     traZODone (DESYREL) 100 MG tablet, 2 at bedtime, Disp: , Rfl:     valsartan (DIOVAN) 80 MG tablet, Take 1 tablet by mouth Daily., Disp: , Rfl:     Aspirin-Salicylamide-Caffeine 650-200-32 MG pack, Take 650 mg by mouth Daily As  Needed (Headaches)., Disp: , Rfl:     Social History     Socioeconomic History    Marital status:    Tobacco Use    Smoking status: Former     Current packs/day: 0.00     Average packs/day: 1 pack/day for 30.0 years (30.0 ttl pk-yrs)     Types: Cigarettes     Start date:      Quit date: 2019     Years since quittin.7     Passive exposure: Past    Smokeless tobacco: Never   Vaping Use    Vaping status: Every Day   Substance and Sexual Activity    Alcohol use: Never    Drug use: Never     Family History   Problem Relation Age of Onset    Heart disease Mother     Hypertension Mother     Obesity Mother     Sleep apnea Mother     Sleep apnea Father     Heart disease Father     Hypertension Father     Diabetes Father     Obesity Father     Obesity Sister     Heart disease Sister     Heart disease Maternal Grandmother     Hypertension Maternal Grandmother     Diabetes Maternal Grandmother     Obesity Maternal Grandmother        Review of Systems   Constitutional:  Positive for fatigue. Negative for chills, diaphoresis, fever and unexpected weight loss.   HENT:  Negative for congestion and facial swelling.    Eyes:  Negative for blurred vision, double vision and discharge.   Respiratory:  Negative for chest tightness, shortness of breath and stridor.    Cardiovascular:  Negative for chest pain, palpitations and leg swelling.   Gastrointestinal:  Positive for GERD. Negative for blood in stool.   Endocrine: Negative for polydipsia.   Genitourinary:  Negative for hematuria.   Musculoskeletal:  Positive for arthralgias, back pain and neck pain.   Skin:  Negative for color change.   Allergic/Immunologic: Negative for immunocompromised state.   Neurological:  Positive for tremors. Negative for confusion.   Psychiatric/Behavioral:  Negative for self-injury.        I have reviewed the ROS and confirm that it's accurate today.    Physical Exam:  Vital Signs:  Weight: 100 kg (220 lb 8 oz)   Body mass index is 35.06  kg/m².  Temp: 98 °F (36.7 °C)   Heart Rate: 72   BP: 136/84     Physical Exam  Vitals reviewed.   Constitutional:       Appearance: He is well-developed.   HENT:      Head: Normocephalic and atraumatic.      Nose: Nose normal.   Eyes:      Conjunctiva/sclera: Conjunctivae normal.      Pupils: Pupils are equal, round, and reactive to light.   Neck:      Thyroid: No thyromegaly.      Vascular: No carotid bruit.      Trachea: No tracheal deviation.   Cardiovascular:      Rate and Rhythm: Normal rate and regular rhythm.      Heart sounds: Normal heart sounds.   Pulmonary:      Effort: Pulmonary effort is normal. No respiratory distress.      Breath sounds: Normal breath sounds.   Abdominal:      General: There is no distension.      Palpations: Abdomen is soft.      Tenderness: There is no abdominal tenderness.   Musculoskeletal:         General: No deformity. Normal range of motion.      Cervical back: Normal range of motion and neck supple.   Skin:     General: Skin is warm and dry.      Findings: No rash.      Comments: Tattoos   Neurological:      Mental Status: He is alert and oriented to person, place, and time.      Cranial Nerves: No cranial nerve deficit.      Motor: Tremor present.      Coordination: Coordination normal.   Psychiatric:         Behavior: Behavior normal.         Thought Content: Thought content normal.         Judgment: Judgment normal.         Patient Active Problem List   Diagnosis    Hypertension    Hyperlipidemia    Back pain    Heartburn    Hypothyroidism    Gout    FORD (dyspnea on exertion)    Anxiety    Migraine    Prediabetes    Tremor    Preoperative clearance    Obesity (BMI 35.0-39.9 without comorbidity)    Morbid exogenous obesity       Assessment:    Crow Watson is a 49 y.o. year old male with medically complicated obesity.    Metabolic and bariatric surgery is deemed medically necessary given the following obesity related comorbidities including  abnormal PFTs with moderate  restriction and decreased diffusion, anxiety, cortical white matter atrophy and memory loss, back pain, dyspnea exertion, former smoker current nicotine vaping, NSAID/aspirin induced gastric ulcer, gout, heartburn, cervical and lumbar herniated discs, hyperlipidemia, hypertension, hypothyroidism, nephrolithiasis, migraine headaches, neuropathy, prediabetes, thrombocytopenia, and benign essential tremor with current Weight: 100 kg (220 lb 8 oz) and Body mass index is 35.06 kg/m²..    Encounter Diagnosis   Name Primary?    Morbid exogenous obesity Yes      Patient is aware that surgery is a tool, and that weight loss and improvement in comorbidities is not guaranteed but only seen in the context of appropriate use, follow up and physical activity.    The patient was present for an approximately a 2.5 hour discussion of the purpose of MBS, how MBS is a tool to assist in achieving weight loss goals, the most common complications and how best to avoid them, and the strategies for short and long term weight loss and improvement in comorbidities.  Ample opportunity to discuss questions was available both in group and during the time of individual examination.    I reviewed his Pola report which is negative.  Labs dated 10/4/2024 unremarkable CMP except for glucose of 131 creatinine 1.33 chloride 97 of note GFR normal at 66.3 unremarkable CBC except for platelet count of 137 of note hemoglobin 13.9.  EGD Dr. Patel dated 5/6/2024 showing an irregular appearing mucosal region of the distal antrum with tenacious whitish exudate of unclear etiology in retrospect large superficial ulcer, several biopsies taken no hiatal hernia Z-line roughly 44 cm.  I noted the patient reported previous upper endoscopy about 5 years ago that was unremarkable denies sleep apnea has a history of white matter disease with atrophy chronic back pain on NSAIDs memory loss neuropathy prediabetes.  Antrum biopsies showed mild chronic gastritis  negative for H. pylori distal esophageal biopsy showed reactive changes and mid esophageal biopsy showed reactive changes.  Pulmonary function test dated 4/5/2024 read by Mulugeta Wei MD show a moderate restrictive pattern with reduced diffusion capacity concern for underlying interstitial lung disease consider high-resolution CT scan for further evaluation psychosocial evaluation dated 2/27/2024 Karmen JESIKA PhD good candidate.  I do not see her discussing any issues with his memory loss or nicotine issues.  Dietitian evaluation dated 2/29/2024 Asha CARMICHAEL RD noting that protein intake is marginal to too low to ensure successful weight loss that intake of processed and simple carbohydrates is high in the form of potatoes biscuits bread sweet snacks and regular soda that he has a variable exposure to fruits and vegetables reliance on fast food includes white Applegate 12 burgers [employer]ne[employer] 2 roche baconators, Burger Flaco two double whoppers fast food triple burgers with cheese and fries Chinese buffet and that his ingestion of sweet beverages and fruit juice 1 can of Mountain Dew and yair Almaguer daily.  When discussed with the patient the wife said she filled it out and they agreed it is usually only 1 baconator or double whopper, not two.  Labs dated 2/27/2024 negative H. pylori breath test CBC showing normal white count anemia normocytic with a hemoglobin of 12.4 hematocrit 37.2 platelet count low at 145 unremarkable CMP except for an ALT of 46 at that time creatinine was 0.98 and GFR was 96.  Hemoglobin A1c 5.0 unremarkable lipid panel except for an LDL of 126 normal TSH pulmonary function test dated 4/5/2024 after bronchodilators FVC 70 FEV1 76 DLCO 63 the only significant improvement after bronchodilators was in PEF at 34%.  Pulmonary clearance dated 7/26/2024 Yessi MOYA PA-C noting the patient is mild to moderate risk for pulmonary complications due to possible underlying pulmonary fibrosis/ILD/hypersensitivity  "pneumonitis and obesity cardiology clearance dated 4/30/2024 [facility] where she Charleen SANCHEZ noting pharmacologic stress test was unremarkable and echocardiogram dated 3/27/2024 unremarkable as well.  Of note ejection fraction 61 to 65% normal systolic and diastolic function no significant valvular disease he cleared the patient at low risk.  Please see scanned records that I have reviewed and signed off on today.  All of this in addition to the patient's unique history and exam has been taken into consideration in determining their appropriate candidacy for MBS.    Complications  of laparoscopic/possible robotic gastric sleeve were discussed. The patient is well aware of the potential complications of surgery that include but not limited to bleeding, infections, deep venous thrombosis, pulmonary embolism, pulmonary complications such as pneumonia, cardiac events, hernias, small bowel obstruction, damage to the spleen or other organs, bowel injury, disfiguring scars, failure to lose weight, need for additional surgery, conversion to an open procedure, and death. Patient is also aware of complications which apply in this particular procedure that can include but are not limited to a \"leak\" at the staple line which in some instances may require conversion to gastric bypass.    The patient is aware if a hiatal hernia is encountered, it likely will be repaired.  R/B/A Rx to hiatal hernia repair were discussed as outlined in our long consent form.  Briefly risks in addition to those for LSG include recurrent hernia, ERIS, dysphagia, esophageal injury, pneumothorax, injury to the vagus nerves, injury to the thoracic duct, aorta or vena cava.    I discussed avoiding all tobacco products, nicotine,  and second hand smoke at least 2 weeks pre-operatively and 6 weeks post-operatively to minimize the risk of sleeve leak.  This included discussing the importance of avoiding even secondhand smoke as the risk of leak is increased.  " Examples discussed:  Avoid going in a house or riding in a car where someone has previously smoked in the last 2 weeks and for 6 weeks postoperatively.  Avoid living in a house where someone smokes (even if it's in a separate room/patio/attached garage, etc.).   Avoid congregating with a group of people who are smoking even if it's outside.  It is OK to be around wood burning fires and barbecue.  I explained that I do not know if marijuana has a same effects but my overall recommendation is to avoid it for 2 weeks prior in 6 weeks after surgery.     Discussed the risks, benefits and alternative therapies at great length as outlined in our extensive consent forms, consent videos, and educational teaching process under the direction of the center's .    A copy of the patient's signed informed consent is on file.    R/B/A Rx discussed to postop anticoagulation incl but not limited to bleeding, drug reaction, venothromboembolic events, etc. and the patient declined.        Plan:    After evaluation today I think the patient is a reasonable candidate for laparoscopic sleeve gastrectomy and EGD.  Wait at least 2 weeks to schedule his surgery so he can be nicotine free for the requisite time.  Also given his large prepyloric ulceration recommended discontinuing his Naprosyn and Stanback (ASA) until surgery as well and continue his daily PPI.   Concern with his ability to avoid short-term complications and have long-term success given his memory issues and exceptionally poor dietary habits noted on dietitian evaluation as above.    Other issues include  abnormal PFTs with moderate restriction and decreased diffusion, anxiety, cortical white matter atrophy and memory loss, back pain, dyspnea exertion, former smoker current nicotine vaping, NSAID/aspirin induced gastric ulcer, gout, heartburn, cervical and lumbar herniated discs, hyperlipidemia, hypertension, hypothyroidism, nephrolithiasis, migraine  headaches, neuropathy, prediabetes, thrombocytopenia, and benign essential tremor    Thank you Dr. Gutierrez for the opportunity evaluate Mr. Watson.      Ron Patel MD

## 2024-10-18 ENCOUNTER — PRE-ADMISSION TESTING (OUTPATIENT)
Dept: PREADMISSION TESTING | Facility: HOSPITAL | Age: 49
End: 2024-10-18
Payer: MEDICARE

## 2024-10-18 DIAGNOSIS — Z01.89 LABORATORY TEST: Primary | ICD-10-CM

## 2024-10-18 LAB
ABO GROUP BLD: NORMAL
DEPRECATED RDW RBC AUTO: 41.1 FL (ref 37–54)
ERYTHROCYTE [DISTWIDTH] IN BLOOD BY AUTOMATED COUNT: 13.2 % (ref 12.3–15.4)
HBA1C MFR BLD: 4.4 % (ref 4.8–5.6)
HCT VFR BLD AUTO: 36.6 % (ref 37.5–51)
HGB BLD-MCNC: 13.1 G/DL (ref 13–17.7)
MCH RBC QN AUTO: 31.1 PG (ref 26.6–33)
MCHC RBC AUTO-ENTMCNC: 35.8 G/DL (ref 31.5–35.7)
MCV RBC AUTO: 86.9 FL (ref 79–97)
MRSA DNA SPEC QL NAA+PROBE: NEGATIVE
PLATELET # BLD AUTO: 155 10*3/MM3 (ref 140–450)
PMV BLD AUTO: 10.7 FL (ref 6–12)
POTASSIUM SERPL-SCNC: 3.8 MMOL/L (ref 3.5–5.2)
QT INTERVAL: 430 MS
QTC INTERVAL: 457 MS
RBC # BLD AUTO: 4.21 10*6/MM3 (ref 4.14–5.8)
RH BLD: POSITIVE
WBC NRBC COR # BLD AUTO: 7.39 10*3/MM3 (ref 3.4–10.8)

## 2024-10-18 PROCEDURE — 93005 ELECTROCARDIOGRAM TRACING: CPT

## 2024-10-18 PROCEDURE — 83036 HEMOGLOBIN GLYCOSYLATED A1C: CPT

## 2024-10-18 PROCEDURE — 86901 BLOOD TYPING SEROLOGIC RH(D): CPT

## 2024-10-18 PROCEDURE — 85027 COMPLETE CBC AUTOMATED: CPT

## 2024-10-18 PROCEDURE — 93010 ELECTROCARDIOGRAM REPORT: CPT | Performed by: INTERNAL MEDICINE

## 2024-10-18 PROCEDURE — 86900 BLOOD TYPING SEROLOGIC ABO: CPT

## 2024-10-18 PROCEDURE — 87641 MR-STAPH DNA AMP PROBE: CPT

## 2024-10-18 PROCEDURE — 36415 COLL VENOUS BLD VENIPUNCTURE: CPT

## 2024-10-18 PROCEDURE — 84132 ASSAY OF SERUM POTASSIUM: CPT

## 2024-10-18 NOTE — PAT
An arrival time for procedure was not provided during PAT visit. If patient had any questions or concerns about their arrival time, they were instructed to contact their surgeon/physician.  Additionally, if the patient referred to an arrival time that was acquired from their my chart account, patient was encouraged to verify that time with their surgeon/physician. Arrival times are NOT provided in Pre Admission Testing Department.    Patient viewed general PAT education video as instructed in their preoperative information received from their surgeon.  Patient stated the general PAT education video was viewed in its entirety and survey completed.  Copies of PAT general education handouts (Incentive Spirometry, Meds to Beds Program, Patient Belongings, Pre-op skin preparation instructions, Blood Glucose testing, Visitor policy, Surgery FAQ, Code H) distributed to patient if not printed. Education related to the PAT pass and skin preparation for surgery (if applicable) completed in PAT as a reinforcement to PAT education video. Patient instructed to return PAT pass provided today as well as completed skin preparation sheet (if applicable) on the day of procedure.     Additionally if patient had not viewed video yet but intended to view it at home or in our waiting area, then referred them to the handout with QR code/link provided during PAT visit.  Encouraged patient/family to read PAT general education handouts thoroughly and notify PAT staff with any questions or concerns. Patient verbalized understanding of all information and priority content.  Patient denies any current skin issues.     Patient to apply Chlorhexadine wipes  to surgical area (as instructed) the night before procedure and the AM of procedure. Wipes provided.    Patient instructed to drink 20 ounces of Gatorade or Gatorlyte (if diabetic) and it needs to be completed 1 hour (for Main OR patients) or 2 hours (scheduled  [facility]) before given arrival time for procedure (NO RED Gatorade and NO Gatorade Zero).    Patient verbalized understanding.    Per Anesthesia Request, patient instructed not to take their ACE/ARB medications on the AM of surgery.    Too early to draw type and screen in PAT.  Please obtain blood bank specimen in pre-op on the day of surgery.    Verified patient previously completed cardiology visit for cardiac risk assessment in preparation for upcoming procedure, completion of 12-lead ECG within six months, and risk assessment letter reviewed. No further interventions required.

## 2024-10-24 ENCOUNTER — ANESTHESIA (OUTPATIENT)
Dept: PERIOP | Facility: HOSPITAL | Age: 49
End: 2024-10-24
Payer: MEDICARE

## 2024-10-24 ENCOUNTER — HOSPITAL ENCOUNTER (INPATIENT)
Facility: HOSPITAL | Age: 49
LOS: 1 days | Discharge: HOME | End: 2024-10-25
Attending: SURGERY | Admitting: SURGERY
Payer: MEDICARE

## 2024-10-24 ENCOUNTER — ANESTHESIA EVENT (OUTPATIENT)
Dept: PERIOP | Facility: HOSPITAL | Age: 49
End: 2024-10-24
Payer: MEDICARE

## 2024-10-24 ENCOUNTER — ANESTHESIA EVENT CONVERTED (OUTPATIENT)
Dept: ANESTHESIOLOGY | Facility: HOSPITAL | Age: 49
End: 2024-10-24
Payer: MEDICARE

## 2024-10-24 DIAGNOSIS — E66.9 OBESITY (BMI 35.0-39.9 WITHOUT COMORBIDITY): Primary | ICD-10-CM

## 2024-10-24 DIAGNOSIS — E66.01 MORBID EXOGENOUS OBESITY: ICD-10-CM

## 2024-10-24 LAB
ABO GROUP BLD: NORMAL
BLD GP AB SCN SERPL QL: NEGATIVE
RH BLD: POSITIVE
T&S EXPIRATION DATE: NORMAL

## 2024-10-24 PROCEDURE — 25010000002 HYDROMORPHONE 1 MG/ML SOLUTION: Performed by: SURGERY

## 2024-10-24 PROCEDURE — 86850 RBC ANTIBODY SCREEN: CPT | Performed by: SURGERY

## 2024-10-24 PROCEDURE — 25810000003 LACTATED RINGERS PER 1000 ML: Performed by: SURGERY

## 2024-10-24 PROCEDURE — 0BQT4ZZ REPAIR DIAPHRAGM, PERCUTANEOUS ENDOSCOPIC APPROACH: ICD-10-PCS | Performed by: SURGERY

## 2024-10-24 PROCEDURE — 88307 TISSUE EXAM BY PATHOLOGIST: CPT | Performed by: SURGERY

## 2024-10-24 PROCEDURE — 94799 UNLISTED PULMONARY SVC/PX: CPT

## 2024-10-24 PROCEDURE — 25010000002 FENTANYL CITRATE (PF) 100 MCG/2ML SOLUTION

## 2024-10-24 PROCEDURE — 25010000002 THIAMINE HCL 200 MG/2ML SOLUTION: Performed by: SURGERY

## 2024-10-24 PROCEDURE — 0DB64Z3 EXCISION OF STOMACH, PERCUTANEOUS ENDOSCOPIC APPROACH, VERTICAL: ICD-10-PCS | Performed by: SURGERY

## 2024-10-24 PROCEDURE — 25010000002 LIDOCAINE PF 1% 1 % SOLUTION

## 2024-10-24 PROCEDURE — 25810000003 SODIUM CHLORIDE PER 500 ML: Performed by: SURGERY

## 2024-10-24 PROCEDURE — 25010000002 ONDANSETRON PER 1 MG

## 2024-10-24 PROCEDURE — 86901 BLOOD TYPING SEROLOGIC RH(D): CPT | Performed by: SURGERY

## 2024-10-24 PROCEDURE — 25010000002 ENOXAPARIN PER 10 MG: Performed by: SURGERY

## 2024-10-24 PROCEDURE — 25010000002 HYDRALAZINE PER 20 MG

## 2024-10-24 PROCEDURE — C1713 ANCHOR/SCREW BN/BN,TIS/BN: HCPCS | Performed by: SURGERY

## 2024-10-24 PROCEDURE — 25010000002 LABETALOL 5 MG/ML SOLUTION

## 2024-10-24 PROCEDURE — 86900 BLOOD TYPING SEROLOGIC ABO: CPT | Performed by: SURGERY

## 2024-10-24 PROCEDURE — 25010000002 DEXAMETHASONE SODIUM PHOSPHATE 10 MG/ML SOLUTION

## 2024-10-24 PROCEDURE — 25010000002 AMISULPRIDE (ANTIEMETIC) 5 MG/2ML SOLUTION

## 2024-10-24 PROCEDURE — 25010000002 LIDOCAINE PF 1% 1 % SOLUTION: Performed by: ANESTHESIOLOGY

## 2024-10-24 PROCEDURE — 25010000002 CEFAZOLIN PER 500 MG: Performed by: SURGERY

## 2024-10-24 PROCEDURE — 25010000002 PROPOFOL 10 MG/ML EMULSION

## 2024-10-24 PROCEDURE — 25010000002 BUPIVACAINE (PF) 0.25 % SOLUTION

## 2024-10-24 PROCEDURE — 25010000002 ONDANSETRON PER 1 MG: Performed by: SURGERY

## 2024-10-24 PROCEDURE — 25010000002 GLUCAGON (RDNA) PER 1 MG

## 2024-10-24 PROCEDURE — 25010000002 MIDAZOLAM PER 1 MG

## 2024-10-24 PROCEDURE — 25010000002 FENTANYL CITRATE (PF) 50 MCG/ML SOLUTION

## 2024-10-24 PROCEDURE — 25010000002 ESMOLOL 100 MG/10ML SOLUTION

## 2024-10-24 PROCEDURE — 43775 LAP SLEEVE GASTRECTOMY: CPT | Performed by: SURGERY

## 2024-10-24 PROCEDURE — 25010000002 SUGAMMADEX 200 MG/2ML SOLUTION

## 2024-10-24 PROCEDURE — 25810000003 LACTATED RINGERS SOLUTION: Performed by: SURGERY

## 2024-10-24 PROCEDURE — 0DJ08ZZ INSPECTION OF UPPER INTESTINAL TRACT, VIA NATURAL OR ARTIFICIAL OPENING ENDOSCOPIC: ICD-10-PCS | Performed by: SURGERY

## 2024-10-24 DEVICE — KNOTLESS TISSUE CONTROL DEVICE, VIOLET UNIDIRECTIONAL (ANTIBACTERIAL) SYNTHETIC ABSORBABLE DEVICE
Type: IMPLANTABLE DEVICE | Site: ABDOMEN | Status: FUNCTIONAL
Brand: STRATAFIX

## 2024-10-24 DEVICE — PBT NON ABSORBABLE WOUND CLOSURE DEVICE
Type: IMPLANTABLE DEVICE | Site: ABDOMEN | Status: FUNCTIONAL
Brand: V-LOC

## 2024-10-24 DEVICE — STPLR GASTRC/SLV TITANSGS NON/ARTICULR PWR W/SEAMGUARD 23CM: Type: IMPLANTABLE DEVICE | Site: STOMACH | Status: FUNCTIONAL

## 2024-10-24 RX ORDER — DROPERIDOL 2.5 MG/ML
0.62 INJECTION, SOLUTION INTRAMUSCULAR; INTRAVENOUS ONCE AS NEEDED
Status: DISCONTINUED | OUTPATIENT
Start: 2024-10-24 | End: 2024-10-24 | Stop reason: HOSPADM

## 2024-10-24 RX ORDER — FENTANYL CITRATE 50 UG/ML
INJECTION, SOLUTION INTRAMUSCULAR; INTRAVENOUS
Status: COMPLETED
Start: 2024-10-24 | End: 2024-10-24

## 2024-10-24 RX ORDER — BUPIVACAINE HYDROCHLORIDE AND EPINEPHRINE 5; 5 MG/ML; UG/ML
INJECTION, SOLUTION PERINEURAL AS NEEDED
Status: DISCONTINUED | OUTPATIENT
Start: 2024-10-24 | End: 2024-10-24 | Stop reason: HOSPADM

## 2024-10-24 RX ORDER — LABETALOL HYDROCHLORIDE 5 MG/ML
INJECTION, SOLUTION INTRAVENOUS
Status: COMPLETED
Start: 2024-10-24 | End: 2024-10-24

## 2024-10-24 RX ORDER — PROMETHAZINE HYDROCHLORIDE 12.5 MG/1
12.5 SUPPOSITORY RECTAL EVERY 6 HOURS PRN
Status: DISCONTINUED | OUTPATIENT
Start: 2024-10-24 | End: 2024-10-25 | Stop reason: HOSPADM

## 2024-10-24 RX ORDER — IBUPROFEN 600 MG/1
TABLET ORAL AS NEEDED
Status: DISCONTINUED | OUTPATIENT
Start: 2024-10-24 | End: 2024-10-24 | Stop reason: SURG

## 2024-10-24 RX ORDER — THIAMINE HYDROCHLORIDE 100 MG/ML
100 INJECTION, SOLUTION INTRAMUSCULAR; INTRAVENOUS ONCE
Status: COMPLETED | OUTPATIENT
Start: 2024-10-24 | End: 2024-10-24

## 2024-10-24 RX ORDER — VALSARTAN 80 MG/1
80 TABLET ORAL DAILY
Status: DISCONTINUED | OUTPATIENT
Start: 2024-10-24 | End: 2024-10-25 | Stop reason: HOSPADM

## 2024-10-24 RX ORDER — DROPERIDOL 2.5 MG/ML
0.62 INJECTION, SOLUTION INTRAMUSCULAR; INTRAVENOUS
Status: DISCONTINUED | OUTPATIENT
Start: 2024-10-24 | End: 2024-10-24 | Stop reason: HOSPADM

## 2024-10-24 RX ORDER — PROPOFOL 10 MG/ML
VIAL (ML) INTRAVENOUS AS NEEDED
Status: DISCONTINUED | OUTPATIENT
Start: 2024-10-24 | End: 2024-10-24 | Stop reason: SURG

## 2024-10-24 RX ORDER — ENALAPRILAT 1.25 MG/ML
1.25 INJECTION INTRAVENOUS EVERY 6 HOURS PRN
Status: CANCELLED | OUTPATIENT
Start: 2024-10-24

## 2024-10-24 RX ORDER — ONDANSETRON 4 MG/1
4 TABLET, ORALLY DISINTEGRATING ORAL EVERY 6 HOURS PRN
Status: DISCONTINUED | OUTPATIENT
Start: 2024-10-24 | End: 2024-10-25 | Stop reason: HOSPADM

## 2024-10-24 RX ORDER — OXYCODONE HYDROCHLORIDE 5 MG/1
5 TABLET ORAL EVERY 6 HOURS PRN
Status: DISCONTINUED | OUTPATIENT
Start: 2024-10-24 | End: 2024-10-25 | Stop reason: HOSPADM

## 2024-10-24 RX ORDER — FENTANYL CITRATE 50 UG/ML
50 INJECTION, SOLUTION INTRAMUSCULAR; INTRAVENOUS
Status: DISCONTINUED | OUTPATIENT
Start: 2024-10-24 | End: 2024-10-24 | Stop reason: HOSPADM

## 2024-10-24 RX ORDER — HYDRALAZINE HYDROCHLORIDE 20 MG/ML
10 INJECTION INTRAMUSCULAR; INTRAVENOUS
Status: DISCONTINUED | OUTPATIENT
Start: 2024-10-24 | End: 2024-10-25 | Stop reason: HOSPADM

## 2024-10-24 RX ORDER — DIPHENHYDRAMINE HYDROCHLORIDE 50 MG/ML
25 INJECTION INTRAMUSCULAR; INTRAVENOUS EVERY 4 HOURS PRN
Status: DISCONTINUED | OUTPATIENT
Start: 2024-10-24 | End: 2024-10-25 | Stop reason: HOSPADM

## 2024-10-24 RX ORDER — HYDRALAZINE HYDROCHLORIDE 20 MG/ML
5 INJECTION INTRAMUSCULAR; INTRAVENOUS
Status: DISCONTINUED | OUTPATIENT
Start: 2024-10-24 | End: 2024-10-24 | Stop reason: HOSPADM

## 2024-10-24 RX ORDER — SIMETHICONE 80 MG
TABLET,CHEWABLE ORAL
Status: COMPLETED
Start: 2024-10-24 | End: 2024-10-24

## 2024-10-24 RX ORDER — SERTRALINE HYDROCHLORIDE 100 MG/1
100 TABLET, FILM COATED ORAL 2 TIMES DAILY
Status: DISCONTINUED | OUTPATIENT
Start: 2024-10-24 | End: 2024-10-25 | Stop reason: HOSPADM

## 2024-10-24 RX ORDER — ACETAMINOPHEN 500 MG
1000 TABLET ORAL ONCE
Status: COMPLETED | OUTPATIENT
Start: 2024-10-24 | End: 2024-10-24

## 2024-10-24 RX ORDER — SODIUM CHLORIDE 9 MG/ML
INJECTION, SOLUTION INTRAVENOUS AS NEEDED
Status: DISCONTINUED | OUTPATIENT
Start: 2024-10-24 | End: 2024-10-24 | Stop reason: HOSPADM

## 2024-10-24 RX ORDER — ONDANSETRON 2 MG/ML
4 INJECTION INTRAMUSCULAR; INTRAVENOUS EVERY 6 HOURS PRN
Status: DISCONTINUED | OUTPATIENT
Start: 2024-10-24 | End: 2024-10-25 | Stop reason: HOSPADM

## 2024-10-24 RX ORDER — SODIUM CHLORIDE 0.9 % (FLUSH) 0.9 %
3 SYRINGE (ML) INJECTION EVERY 12 HOURS SCHEDULED
Status: DISCONTINUED | OUTPATIENT
Start: 2024-10-24 | End: 2024-10-24 | Stop reason: HOSPADM

## 2024-10-24 RX ORDER — HYDRALAZINE HYDROCHLORIDE 20 MG/ML
INJECTION INTRAMUSCULAR; INTRAVENOUS
Status: COMPLETED
Start: 2024-10-24 | End: 2024-10-24

## 2024-10-24 RX ORDER — SODIUM CHLORIDE 0.9 % (FLUSH) 0.9 %
3-10 SYRINGE (ML) INJECTION AS NEEDED
Status: DISCONTINUED | OUTPATIENT
Start: 2024-10-24 | End: 2024-10-24 | Stop reason: HOSPADM

## 2024-10-24 RX ORDER — PANTOPRAZOLE SODIUM 40 MG/1
40 TABLET, DELAYED RELEASE ORAL EVERY MORNING
Status: DISCONTINUED | OUTPATIENT
Start: 2024-10-25 | End: 2024-10-25 | Stop reason: HOSPADM

## 2024-10-24 RX ORDER — SODIUM CHLORIDE, SODIUM LACTATE, POTASSIUM CHLORIDE, CALCIUM CHLORIDE 600; 310; 30; 20 MG/100ML; MG/100ML; MG/100ML; MG/100ML
9 INJECTION, SOLUTION INTRAVENOUS CONTINUOUS
Status: ACTIVE | OUTPATIENT
Start: 2024-10-24 | End: 2024-10-25

## 2024-10-24 RX ORDER — IPRATROPIUM BROMIDE AND ALBUTEROL SULFATE 2.5; .5 MG/3ML; MG/3ML
3 SOLUTION RESPIRATORY (INHALATION) ONCE AS NEEDED
Status: DISCONTINUED | OUTPATIENT
Start: 2024-10-24 | End: 2024-10-24 | Stop reason: HOSPADM

## 2024-10-24 RX ORDER — BUPIVACAINE HYDROCHLORIDE 2.5 MG/ML
INJECTION, SOLUTION EPIDURAL; INFILTRATION; INTRACAUDAL
Status: COMPLETED | OUTPATIENT
Start: 2024-10-24 | End: 2024-10-24

## 2024-10-24 RX ORDER — ALPRAZOLAM 0.25 MG
0.25 TABLET ORAL ONCE AS NEEDED
Status: DISCONTINUED | OUTPATIENT
Start: 2024-10-24 | End: 2024-10-25 | Stop reason: HOSPADM

## 2024-10-24 RX ORDER — MIDAZOLAM HYDROCHLORIDE 1 MG/ML
1 INJECTION INTRAMUSCULAR; INTRAVENOUS
Status: DISCONTINUED | OUTPATIENT
Start: 2024-10-24 | End: 2024-10-24 | Stop reason: HOSPADM

## 2024-10-24 RX ORDER — PROMETHAZINE HYDROCHLORIDE 25 MG/1
25 TABLET ORAL ONCE AS NEEDED
Status: DISCONTINUED | OUTPATIENT
Start: 2024-10-24 | End: 2024-10-24 | Stop reason: HOSPADM

## 2024-10-24 RX ORDER — DEXAMETHASONE SODIUM PHOSPHATE 10 MG/ML
INJECTION, SOLUTION INTRAMUSCULAR; INTRAVENOUS AS NEEDED
Status: DISCONTINUED | OUTPATIENT
Start: 2024-10-24 | End: 2024-10-24 | Stop reason: SURG

## 2024-10-24 RX ORDER — NALOXONE HCL 0.4 MG/ML
0.4 VIAL (ML) INJECTION AS NEEDED
Status: DISCONTINUED | OUTPATIENT
Start: 2024-10-24 | End: 2024-10-24 | Stop reason: HOSPADM

## 2024-10-24 RX ORDER — DEXAMETHASONE SODIUM PHOSPHATE 10 MG/ML
INJECTION, SOLUTION INTRAMUSCULAR; INTRAVENOUS
Status: COMPLETED | OUTPATIENT
Start: 2024-10-24 | End: 2024-10-24

## 2024-10-24 RX ORDER — SIMETHICONE 80 MG
80 TABLET,CHEWABLE ORAL 4 TIMES DAILY PRN
Status: DISCONTINUED | OUTPATIENT
Start: 2024-10-24 | End: 2024-10-25 | Stop reason: HOSPADM

## 2024-10-24 RX ORDER — PROMETHAZINE HYDROCHLORIDE 12.5 MG/1
12.5 TABLET ORAL EVERY 6 HOURS PRN
Status: DISCONTINUED | OUTPATIENT
Start: 2024-10-24 | End: 2024-10-25 | Stop reason: HOSPADM

## 2024-10-24 RX ORDER — GABAPENTIN 100 MG/1
100 CAPSULE ORAL 3 TIMES DAILY
Status: DISCONTINUED | OUTPATIENT
Start: 2024-10-24 | End: 2024-10-25 | Stop reason: HOSPADM

## 2024-10-24 RX ORDER — ESMOLOL HYDROCHLORIDE 10 MG/ML
INJECTION INTRAVENOUS AS NEEDED
Status: DISCONTINUED | OUTPATIENT
Start: 2024-10-24 | End: 2024-10-24 | Stop reason: SURG

## 2024-10-24 RX ORDER — ONDANSETRON 2 MG/ML
INJECTION INTRAMUSCULAR; INTRAVENOUS AS NEEDED
Status: DISCONTINUED | OUTPATIENT
Start: 2024-10-24 | End: 2024-10-24 | Stop reason: SURG

## 2024-10-24 RX ORDER — PANTOPRAZOLE SODIUM 40 MG/10ML
40 INJECTION, POWDER, LYOPHILIZED, FOR SOLUTION INTRAVENOUS ONCE
Status: COMPLETED | OUTPATIENT
Start: 2024-10-24 | End: 2024-10-24

## 2024-10-24 RX ORDER — SCOLOPAMINE TRANSDERMAL SYSTEM 1 MG/1
1 PATCH, EXTENDED RELEASE TRANSDERMAL CONTINUOUS
Status: DISCONTINUED | OUTPATIENT
Start: 2024-10-24 | End: 2024-10-25 | Stop reason: HOSPADM

## 2024-10-24 RX ORDER — SODIUM CHLORIDE, SODIUM LACTATE, POTASSIUM CHLORIDE, CALCIUM CHLORIDE 600; 310; 30; 20 MG/100ML; MG/100ML; MG/100ML; MG/100ML
9 INJECTION, SOLUTION INTRAVENOUS CONTINUOUS
Status: CANCELLED | OUTPATIENT
Start: 2024-10-25 | End: 2024-10-25

## 2024-10-24 RX ORDER — MIDAZOLAM HYDROCHLORIDE 1 MG/ML
INJECTION INTRAMUSCULAR; INTRAVENOUS AS NEEDED
Status: DISCONTINUED | OUTPATIENT
Start: 2024-10-24 | End: 2024-10-24 | Stop reason: SURG

## 2024-10-24 RX ORDER — ACETAMINOPHEN 500 MG
1000 TABLET ORAL EVERY 8 HOURS SCHEDULED
Status: DISCONTINUED | OUTPATIENT
Start: 2024-10-24 | End: 2024-10-25 | Stop reason: HOSPADM

## 2024-10-24 RX ORDER — PANTOPRAZOLE SODIUM 40 MG/10ML
40 INJECTION, POWDER, LYOPHILIZED, FOR SOLUTION INTRAVENOUS ONCE
Status: COMPLETED | OUTPATIENT
Start: 2024-10-25 | End: 2024-10-24

## 2024-10-24 RX ORDER — ENOXAPARIN SODIUM 100 MG/ML
40 INJECTION SUBCUTANEOUS ONCE
Status: DISCONTINUED | OUTPATIENT
Start: 2024-10-24 | End: 2024-10-24 | Stop reason: HOSPADM

## 2024-10-24 RX ORDER — ALLOPURINOL 300 MG/1
300 TABLET ORAL 2 TIMES DAILY
Status: DISCONTINUED | OUTPATIENT
Start: 2024-10-24 | End: 2024-10-25 | Stop reason: HOSPADM

## 2024-10-24 RX ORDER — LABETALOL 200 MG/1
200 TABLET, FILM COATED ORAL
Status: DISCONTINUED | OUTPATIENT
Start: 2024-10-24 | End: 2024-10-25 | Stop reason: HOSPADM

## 2024-10-24 RX ORDER — PROMETHAZINE HYDROCHLORIDE 25 MG/1
25 SUPPOSITORY RECTAL ONCE AS NEEDED
Status: DISCONTINUED | OUTPATIENT
Start: 2024-10-24 | End: 2024-10-24 | Stop reason: HOSPADM

## 2024-10-24 RX ORDER — MEPERIDINE HYDROCHLORIDE 25 MG/ML
12.5 INJECTION INTRAMUSCULAR; INTRAVENOUS; SUBCUTANEOUS
Status: DISCONTINUED | OUTPATIENT
Start: 2024-10-24 | End: 2024-10-24 | Stop reason: HOSPADM

## 2024-10-24 RX ORDER — SODIUM CHLORIDE AND POTASSIUM CHLORIDE 150; 450 MG/100ML; MG/100ML
125 INJECTION, SOLUTION INTRAVENOUS CONTINUOUS
Status: DISCONTINUED | OUTPATIENT
Start: 2024-10-25 | End: 2024-10-25 | Stop reason: HOSPADM

## 2024-10-24 RX ORDER — NALOXONE HCL 0.4 MG/ML
0.1 VIAL (ML) INJECTION
Status: DISCONTINUED | OUTPATIENT
Start: 2024-10-24 | End: 2024-10-25 | Stop reason: HOSPADM

## 2024-10-24 RX ORDER — ONDANSETRON 2 MG/ML
4 INJECTION INTRAMUSCULAR; INTRAVENOUS ONCE AS NEEDED
Status: DISCONTINUED | OUTPATIENT
Start: 2024-10-24 | End: 2024-10-24 | Stop reason: HOSPADM

## 2024-10-24 RX ORDER — ROCURONIUM BROMIDE 10 MG/ML
INJECTION, SOLUTION INTRAVENOUS AS NEEDED
Status: DISCONTINUED | OUTPATIENT
Start: 2024-10-24 | End: 2024-10-24 | Stop reason: SURG

## 2024-10-24 RX ORDER — SODIUM CHLORIDE, SODIUM LACTATE, POTASSIUM CHLORIDE, CALCIUM CHLORIDE 600; 310; 30; 20 MG/100ML; MG/100ML; MG/100ML; MG/100ML
150 INJECTION, SOLUTION INTRAVENOUS CONTINUOUS
Status: DISCONTINUED | OUTPATIENT
Start: 2024-10-24 | End: 2024-10-25 | Stop reason: HOSPADM

## 2024-10-24 RX ORDER — LEVOTHYROXINE SODIUM 50 UG/1
50 TABLET ORAL
Status: DISCONTINUED | OUTPATIENT
Start: 2024-10-25 | End: 2024-10-25 | Stop reason: HOSPADM

## 2024-10-24 RX ORDER — AMLODIPINE BESYLATE 5 MG/1
5 TABLET ORAL EVERY EVENING
Status: DISCONTINUED | OUTPATIENT
Start: 2024-10-24 | End: 2024-10-25 | Stop reason: HOSPADM

## 2024-10-24 RX ORDER — ACETAMINOPHEN 160 MG/5ML
1000 SOLUTION ORAL EVERY 8 HOURS SCHEDULED
Status: DISCONTINUED | OUTPATIENT
Start: 2024-10-24 | End: 2024-10-25 | Stop reason: HOSPADM

## 2024-10-24 RX ORDER — HYDRALAZINE HYDROCHLORIDE 20 MG/ML
INJECTION INTRAMUSCULAR; INTRAVENOUS AS NEEDED
Status: DISCONTINUED | OUTPATIENT
Start: 2024-10-24 | End: 2024-10-24 | Stop reason: SURG

## 2024-10-24 RX ORDER — ENOXAPARIN SODIUM 100 MG/ML
40 INJECTION SUBCUTANEOUS EVERY 24 HOURS
Status: DISCONTINUED | OUTPATIENT
Start: 2024-10-25 | End: 2024-10-25 | Stop reason: HOSPADM

## 2024-10-24 RX ORDER — QUETIAPINE FUMARATE 100 MG/1
200 TABLET, FILM COATED ORAL NIGHTLY
Status: DISCONTINUED | OUTPATIENT
Start: 2024-10-25 | End: 2024-10-25 | Stop reason: HOSPADM

## 2024-10-24 RX ORDER — LIDOCAINE HYDROCHLORIDE 10 MG/ML
INJECTION, SOLUTION EPIDURAL; INFILTRATION; INTRACAUDAL; PERINEURAL AS NEEDED
Status: DISCONTINUED | OUTPATIENT
Start: 2024-10-24 | End: 2024-10-24 | Stop reason: SURG

## 2024-10-24 RX ORDER — FENTANYL CITRATE 50 UG/ML
INJECTION, SOLUTION INTRAMUSCULAR; INTRAVENOUS AS NEEDED
Status: DISCONTINUED | OUTPATIENT
Start: 2024-10-24 | End: 2024-10-24 | Stop reason: SURG

## 2024-10-24 RX ORDER — CYANOCOBALAMIN 1000 UG/ML
1000 INJECTION, SOLUTION INTRAMUSCULAR; SUBCUTANEOUS ONCE
Status: COMPLETED | OUTPATIENT
Start: 2024-10-25 | End: 2024-10-25

## 2024-10-24 RX ORDER — SODIUM CHLORIDE, SODIUM LACTATE, POTASSIUM CHLORIDE, CALCIUM CHLORIDE 600; 310; 30; 20 MG/100ML; MG/100ML; MG/100ML; MG/100ML
150 INJECTION, SOLUTION INTRAVENOUS CONTINUOUS
Status: ACTIVE | OUTPATIENT
Start: 2024-10-24 | End: 2024-10-25

## 2024-10-24 RX ORDER — SODIUM CHLORIDE 0.9 % (FLUSH) 0.9 %
10 SYRINGE (ML) INJECTION AS NEEDED
Status: DISCONTINUED | OUTPATIENT
Start: 2024-10-24 | End: 2024-10-24 | Stop reason: HOSPADM

## 2024-10-24 RX ORDER — CHLORHEXIDINE GLUCONATE ORAL RINSE 1.2 MG/ML
30 SOLUTION DENTAL
Status: DISPENSED | OUTPATIENT
Start: 2024-10-24 | End: 2024-10-24

## 2024-10-24 RX ORDER — TRANEXAMIC ACID 10 MG/ML
1000 INJECTION, SOLUTION INTRAVENOUS ONCE
Status: COMPLETED | OUTPATIENT
Start: 2024-10-24 | End: 2024-10-24

## 2024-10-24 RX ORDER — AMITRIPTYLINE HYDROCHLORIDE 50 MG/1
50 TABLET ORAL NIGHTLY
Status: DISCONTINUED | OUTPATIENT
Start: 2024-10-24 | End: 2024-10-25 | Stop reason: HOSPADM

## 2024-10-24 RX ORDER — HYDROMORPHONE HYDROCHLORIDE 2 MG/1
2 TABLET ORAL EVERY 4 HOURS PRN
Status: DISCONTINUED | OUTPATIENT
Start: 2024-10-24 | End: 2024-10-25 | Stop reason: HOSPADM

## 2024-10-24 RX ORDER — GABAPENTIN 250 MG/5ML
100 SOLUTION ORAL 3 TIMES DAILY
Status: DISCONTINUED | OUTPATIENT
Start: 2024-10-24 | End: 2024-10-25 | Stop reason: HOSPADM

## 2024-10-24 RX ORDER — ENOXAPARIN SODIUM 100 MG/ML
INJECTION SUBCUTANEOUS AS NEEDED
Status: DISCONTINUED | OUTPATIENT
Start: 2024-10-24 | End: 2024-10-24 | Stop reason: HOSPADM

## 2024-10-24 RX ORDER — TRAZODONE HYDROCHLORIDE 100 MG/1
200 TABLET ORAL NIGHTLY
Status: DISCONTINUED | OUTPATIENT
Start: 2024-10-24 | End: 2024-10-25 | Stop reason: HOSPADM

## 2024-10-24 RX ORDER — SODIUM CHLORIDE 9 MG/ML
9 INJECTION, SOLUTION INTRAVENOUS AS NEEDED
Status: DISCONTINUED | OUTPATIENT
Start: 2024-10-24 | End: 2024-10-24 | Stop reason: HOSPADM

## 2024-10-24 RX ORDER — GABAPENTIN 300 MG/1
600 CAPSULE ORAL ONCE
Status: COMPLETED | OUTPATIENT
Start: 2024-10-24 | End: 2024-10-24

## 2024-10-24 RX ORDER — FIBRINOGEN HUMAN, HUMAN THROMBIN 4 ML
KIT TOPICAL AS NEEDED
Status: DISCONTINUED | OUTPATIENT
Start: 2024-10-24 | End: 2024-10-24 | Stop reason: HOSPADM

## 2024-10-24 RX ORDER — LABETALOL HYDROCHLORIDE 5 MG/ML
10 INJECTION, SOLUTION INTRAVENOUS
Status: COMPLETED | OUTPATIENT
Start: 2024-10-24 | End: 2024-10-24

## 2024-10-24 RX ORDER — LABETALOL HYDROCHLORIDE 5 MG/ML
5 INJECTION, SOLUTION INTRAVENOUS
Status: DISCONTINUED | OUTPATIENT
Start: 2024-10-24 | End: 2024-10-24 | Stop reason: HOSPADM

## 2024-10-24 RX ORDER — LIDOCAINE HYDROCHLORIDE 10 MG/ML
0.5 INJECTION, SOLUTION EPIDURAL; INFILTRATION; INTRACAUDAL; PERINEURAL ONCE AS NEEDED
Status: COMPLETED | OUTPATIENT
Start: 2024-10-24 | End: 2024-10-24

## 2024-10-24 RX ORDER — ALBUTEROL SULFATE 0.83 MG/ML
2.5 SOLUTION RESPIRATORY (INHALATION) EVERY 4 HOURS PRN
Status: DISCONTINUED | OUTPATIENT
Start: 2024-10-24 | End: 2024-10-25 | Stop reason: HOSPADM

## 2024-10-24 RX ORDER — LABETALOL 200 MG/1
400 TABLET, FILM COATED ORAL 2 TIMES DAILY
Status: DISCONTINUED | OUTPATIENT
Start: 2024-10-24 | End: 2024-10-25 | Stop reason: HOSPADM

## 2024-10-24 RX ORDER — SODIUM CHLORIDE 0.9 % (FLUSH) 0.9 %
10 SYRINGE (ML) INJECTION EVERY 12 HOURS SCHEDULED
Status: DISCONTINUED | OUTPATIENT
Start: 2024-10-24 | End: 2024-10-24 | Stop reason: HOSPADM

## 2024-10-24 RX ORDER — LORAZEPAM 1 MG/1
1 TABLET ORAL EVERY 12 HOURS PRN
Status: DISCONTINUED | OUTPATIENT
Start: 2024-10-24 | End: 2024-10-25 | Stop reason: HOSPADM

## 2024-10-24 RX ADMIN — FENTANYL CITRATE 50 MCG: 50 INJECTION, SOLUTION INTRAMUSCULAR; INTRAVENOUS at 15:10

## 2024-10-24 RX ADMIN — MIDAZOLAM HYDROCHLORIDE 2 MG: 1 INJECTION, SOLUTION INTRAMUSCULAR; INTRAVENOUS at 11:55

## 2024-10-24 RX ADMIN — THIAMINE HYDROCHLORIDE 100 MG: 100 INJECTION, SOLUTION INTRAMUSCULAR; INTRAVENOUS at 18:08

## 2024-10-24 RX ADMIN — FENTANYL CITRATE 50 MCG: 50 INJECTION, SOLUTION INTRAMUSCULAR; INTRAVENOUS at 15:28

## 2024-10-24 RX ADMIN — OXYCODONE HYDROCHLORIDE 5 MG: 5 TABLET ORAL at 20:22

## 2024-10-24 RX ADMIN — HYDROMORPHONE HYDROCHLORIDE 2 MG: 2 TABLET ORAL at 18:08

## 2024-10-24 RX ADMIN — ALLOPURINOL 300 MG: 300 TABLET ORAL at 20:22

## 2024-10-24 RX ADMIN — AMISULPRIDE 10 MG: 2.5 INJECTION, SOLUTION INTRAVENOUS at 14:11

## 2024-10-24 RX ADMIN — DEXAMETHASONE SODIUM PHOSPHATE 4 MG: 10 INJECTION, SOLUTION INTRAMUSCULAR; INTRAVENOUS at 12:04

## 2024-10-24 RX ADMIN — AMLODIPINE BESYLATE 5 MG: 5 TABLET ORAL at 18:08

## 2024-10-24 RX ADMIN — FENTANYL CITRATE 50 MCG: 50 INJECTION, SOLUTION INTRAMUSCULAR; INTRAVENOUS at 16:06

## 2024-10-24 RX ADMIN — ONDANSETRON 4 MG: 2 INJECTION INTRAMUSCULAR; INTRAVENOUS at 22:18

## 2024-10-24 RX ADMIN — SCOPOLAMINE 1 PATCH: 1.5 PATCH, EXTENDED RELEASE TRANSDERMAL at 10:54

## 2024-10-24 RX ADMIN — ONDANSETRON 4 MG: 2 INJECTION INTRAMUSCULAR; INTRAVENOUS at 12:07

## 2024-10-24 RX ADMIN — PANTOPRAZOLE SODIUM 40 MG: 40 INJECTION, POWDER, FOR SOLUTION INTRAVENOUS at 23:46

## 2024-10-24 RX ADMIN — SODIUM CHLORIDE 2000 MG: 900 INJECTION INTRAVENOUS at 20:23

## 2024-10-24 RX ADMIN — SIMETHICONE 80 MG: 80 TABLET, CHEWABLE ORAL at 22:18

## 2024-10-24 RX ADMIN — SIMETHICONE 80 MG: 80 TABLET, CHEWABLE ORAL at 15:28

## 2024-10-24 RX ADMIN — ROCURONIUM BROMIDE 20 MG: 10 INJECTION INTRAVENOUS at 12:51

## 2024-10-24 RX ADMIN — ROCURONIUM BROMIDE 20 MG: 10 INJECTION INTRAVENOUS at 13:34

## 2024-10-24 RX ADMIN — SODIUM CHLORIDE, POTASSIUM CHLORIDE, SODIUM LACTATE AND CALCIUM CHLORIDE 1000 ML: 600; 310; 30; 20 INJECTION, SOLUTION INTRAVENOUS at 10:20

## 2024-10-24 RX ADMIN — DEXAMETHASONE SODIUM PHOSPHATE 8 MG: 10 INJECTION, SOLUTION INTRAMUSCULAR; INTRAVENOUS at 14:53

## 2024-10-24 RX ADMIN — ROCURONIUM BROMIDE 50 MG: 10 INJECTION INTRAVENOUS at 12:00

## 2024-10-24 RX ADMIN — AMITRIPTYLINE HYDROCHLORIDE 50 MG: 50 TABLET, FILM COATED ORAL at 20:23

## 2024-10-24 RX ADMIN — Medication 10 MG: at 18:36

## 2024-10-24 RX ADMIN — ESMOLOL HYDROCHLORIDE 10 MG: 10 INJECTION, SOLUTION INTRAVENOUS at 14:40

## 2024-10-24 RX ADMIN — GLUCAGON 1 MG: KIT at 12:50

## 2024-10-24 RX ADMIN — LIDOCAINE HYDROCHLORIDE 0.5 ML: 10 INJECTION, SOLUTION EPIDURAL; INFILTRATION; INTRACAUDAL; PERINEURAL at 10:20

## 2024-10-24 RX ADMIN — SERTRALINE 100 MG: 100 TABLET, FILM COATED ORAL at 20:23

## 2024-10-24 RX ADMIN — SODIUM CHLORIDE 2000 MG: 900 INJECTION INTRAVENOUS at 12:00

## 2024-10-24 RX ADMIN — FENTANYL CITRATE 100 MCG: 50 INJECTION, SOLUTION INTRAMUSCULAR; INTRAVENOUS at 12:00

## 2024-10-24 RX ADMIN — PROPOFOL 25 MCG/KG/MIN: 10 INJECTION, EMULSION INTRAVENOUS at 12:07

## 2024-10-24 RX ADMIN — SODIUM CHLORIDE, SODIUM LACTATE, POTASSIUM CHLORIDE, CALCIUM CHLORIDE 150 ML/HR: 20; 30; 600; 310 INJECTION, SOLUTION INTRAVENOUS at 18:08

## 2024-10-24 RX ADMIN — DEXAMETHASONE SODIUM PHOSPHATE 6 MG: 10 INJECTION INTRAMUSCULAR; INTRAVENOUS at 12:07

## 2024-10-24 RX ADMIN — LABETALOL HYDROCHLORIDE 400 MG: 200 TABLET, FILM COATED ORAL at 20:22

## 2024-10-24 RX ADMIN — AMISULPRIDE 10 MG: 2.5 INJECTION, SOLUTION INTRAVENOUS at 14:58

## 2024-10-24 RX ADMIN — Medication 10 MG: at 19:05

## 2024-10-24 RX ADMIN — GABAPENTIN 600 MG: 300 CAPSULE ORAL at 10:40

## 2024-10-24 RX ADMIN — GABAPENTIN 100 MG: 100 CAPSULE ORAL at 18:29

## 2024-10-24 RX ADMIN — LABETALOL HYDROCHLORIDE 5 MG: 5 INJECTION, SOLUTION INTRAVENOUS at 15:33

## 2024-10-24 RX ADMIN — PANTOPRAZOLE SODIUM 40 MG: 40 INJECTION, POWDER, FOR SOLUTION INTRAVENOUS at 10:40

## 2024-10-24 RX ADMIN — SODIUM CHLORIDE, POTASSIUM CHLORIDE, SODIUM LACTATE AND CALCIUM CHLORIDE 150 ML/HR: 600; 310; 30; 20 INJECTION, SOLUTION INTRAVENOUS at 11:00

## 2024-10-24 RX ADMIN — TRAZODONE HYDROCHLORIDE 200 MG: 100 TABLET ORAL at 20:22

## 2024-10-24 RX ADMIN — CHLORHEXIDINE GLUCONATE ORAL RINSE 30 ML: 1.2 SOLUTION DENTAL at 10:40

## 2024-10-24 RX ADMIN — SODIUM CHLORIDE, POTASSIUM CHLORIDE, SODIUM LACTATE AND CALCIUM CHLORIDE: 600; 310; 30; 20 INJECTION, SOLUTION INTRAVENOUS at 13:39

## 2024-10-24 RX ADMIN — HYDROMORPHONE HYDROCHLORIDE 1 MG: 1 INJECTION, SOLUTION INTRAMUSCULAR; INTRAVENOUS; SUBCUTANEOUS at 22:18

## 2024-10-24 RX ADMIN — VALSARTAN 80 MG: 80 TABLET, FILM COATED ORAL at 18:29

## 2024-10-24 RX ADMIN — FENTANYL CITRATE 50 MCG: 50 INJECTION, SOLUTION INTRAMUSCULAR; INTRAVENOUS at 14:57

## 2024-10-24 RX ADMIN — LIDOCAINE HYDROCHLORIDE 50 MG: 10 INJECTION, SOLUTION EPIDURAL; INFILTRATION; INTRACAUDAL; PERINEURAL at 12:00

## 2024-10-24 RX ADMIN — BUPIVACAINE HYDROCHLORIDE 60 ML: 2.5 INJECTION, SOLUTION EPIDURAL; INFILTRATION; INTRACAUDAL; PERINEURAL at 12:04

## 2024-10-24 RX ADMIN — HYDRALAZINE HYDROCHLORIDE 3 MG: 20 INJECTION INTRAMUSCULAR; INTRAVENOUS at 15:06

## 2024-10-24 RX ADMIN — HYDRALAZINE HYDROCHLORIDE 5 MG: 20 INJECTION INTRAMUSCULAR; INTRAVENOUS at 15:49

## 2024-10-24 RX ADMIN — PROPOFOL 200 MG: 10 INJECTION, EMULSION INTRAVENOUS at 12:00

## 2024-10-24 RX ADMIN — TRANEXAMIC ACID 1000 MG: 10 INJECTION, SOLUTION INTRAVENOUS at 12:35

## 2024-10-24 RX ADMIN — SUGAMMADEX 200 MG: 100 INJECTION, SOLUTION INTRAVENOUS at 14:23

## 2024-10-24 RX ADMIN — ESMOLOL HYDROCHLORIDE 20 MG: 10 INJECTION, SOLUTION INTRAVENOUS at 14:46

## 2024-10-24 RX ADMIN — LABETALOL HYDROCHLORIDE 5 MG: 5 INJECTION, SOLUTION INTRAVENOUS at 15:47

## 2024-10-24 RX ADMIN — ACETAMINOPHEN 1000 MG: 500 TABLET ORAL at 18:08

## 2024-10-24 RX ADMIN — ACETAMINOPHEN 1000 MG: 500 TABLET ORAL at 10:40

## 2024-10-24 NOTE — ANESTHESIA POSTPROCEDURE EVALUATION
Patient: Crow Watson    Procedure Summary       Date: 10/24/24 Room / Location:  [facility]  [facility]    Anesthesia Start: 1154 Anesthesia Stop: 1524    Procedures:       GASTRIC SLEEVE LAPAROSCOPIC (Abdomen)      ESOPHAGOGASTRODUODENOSCOPY (Esophagus)      HIATAL HERNIA REPAIR LAPAROSCOPIC (Abdomen) Diagnosis:       Morbid exogenous obesity      Hiatal hernia with gastroesophageal reflux      (Morbid exogenous obesity [E66.01])    Surgeons: Ron Patel MD Provider: Jem Eckert MD    Anesthesia Type: general ASA Status: 3            Anesthesia Type: general    Vitals  Vitals Value Taken Time   /108 10/24/24 1520   Temp     Pulse 68 10/24/24 1522   Resp     SpO2 96 % 10/24/24 1522   Vitals shown include unfiled device data.        Post Anesthesia Care and Evaluation    Patient location during evaluation: PACU  Patient participation: complete - patient participated  Level of consciousness: awake and alert  Pain management: adequate    Airway patency: patent  Anesthetic complications: No anesthetic complications  PONV Status: none  Cardiovascular status: hemodynamically stable and acceptable  Respiratory status: nonlabored ventilation, acceptable and nasal cannula  Hydration status: acceptable    Comments: 97.0 [id number] 97.2

## 2024-10-24 NOTE — INTERVAL H&P NOTE
H&P updated. The patient was examined and the following changes are noted:  MRSA screen negative, Hb A1C low 4.40.  Wife with him in preop.  Both confirm no nicotine, ASA or other ulcerogenic agents for the last 2 weeks and will continue to avoid for at least another 6 weeks and wishes to proceed.

## 2024-10-24 NOTE — BRIEF OP NOTE
GASTRIC SLEEVE LAPAROSCOPIC, ESOPHAGOGASTRODUODENOSCOPY, HIATAL HERNIA REPAIR LAPAROSCOPIC  Progress Note    Crow Watson  10/24/2024    Pre-op Diagnosis:   Morbid exogenous obesity [E66.01]       Post-Op Diagnosis Codes:     * Morbid exogenous obesity [E66.01]     * Hiatal hernia with gastroesophageal reflux [K44.9, K21.9]    Procedure/CPT® Codes:  MS LAPS GSTRC RSTRICTIV PX LONGITUDINAL GASTRECTOMY [02424]  MS LAPS SURG ESOPG/GSTR FUNDOPLASTY [72373]  MS ESOPHAGOGASTRODUODENOSCOPY TRANSORAL DIAGNOSTIC [04096]      Procedure(s):  GASTRIC SLEEVE LAPAROSCOPIC    HIATAL HERNIA REPAIR LAPAROSCOPIC WITH FALCIFORM LIGAMENT REINFORCEMENT    ESOPHAGOGASTRODUODENOSCOPY              Surgeon(s):  Ron Patel MD    Anesthesia: General with Block    Staff:   Circulator: Jaz Boss RN; Rowan Lamar RN  Scrub Person: Dee Wang; René Rojas  Nursing Assistant: Re Moore CNA         Estimated Blood Loss:  50 cc    Urine Voided: * No values recorded between 10/24/2024 11:54 AM and 10/24/2024  2:15 PM *    Specimens:                Specimens       ID Source Type Tests Collected By Collected At Frozen?    A Stomach Tissue TISSUE PATHOLOGY EXAM   Ron Patel MD 10/24/24 1302 No    Description: SUB-TOTAL GASTRECTOMY    This specimen was not marked as sent.                  Drains:   Closed/Suction Drain 1 Right Abdomen Bulb (Active)   #10 TYSON    Findings:  Posterior fat/lipoma in the hiatus widening the hiatus and creating a hiatal hernia.  Lipoma reduced and hiatal hernia repair performed posteriorly with falciform ligament reinforcement.  Extensive omental adhesions to the liver and gallbladder precluding visualization of the gallbladder.  Adhesions of the first and proximal 2nd portion of the duodenum to the undersurface of the right medial liver.  On intraoperative endoscopy, narrowing noted at the angularis corresponding externally to the anterior side of the sleeve staple line at  the angularis.  This was addressed by performing an anterior seromyotomy in this area.          Complications: None          Ron Patel MD     Date: 10/24/2024  Time: 14:21 EDT

## 2024-10-24 NOTE — ANESTHESIA PROCEDURE NOTES
"Peripheral Block      Patient reassessed immediately prior to procedure    Patient location during procedure: OR  Start time: 10/24/2024 12:04 PM  Reason for block: at surgeon's request and post-op pain management  Performed by  Anesthesiologist: Jem Eckert MD  Preanesthetic Checklist  Completed: patient identified, IV checked, site marked, risks and benefits discussed, surgical consent, monitors and equipment checked, pre-op evaluation and timeout performed  Prep:  Pt Position: supine  Sterile barriers:cap, gloves, mask and washed/disinfected hands  Prep: ChloraPrep  Patient monitoring: blood pressure monitoring, continuous pulse oximetry and EKG  Procedure    Sedation: yes  Performed under: general  Guidance:ultrasound guided  Images:still images obtained, printed/placed on chart    Laterality:Bilateral  Block Type:TAP  Injection Technique:single-shot  Needle Type:short-bevel and echogenic  Needle Gauge:20 G  Resistance on Injection: none    Medications Used: dexamethasone sodium phosphate injection - Injection   4 mg - 10/24/2024 12:04:00 PM  bupivacaine PF (MARCAINE) 0.25 % injection - Injection   60 mL - 10/24/2024 12:04:00 PM      Medications  Comment:Block Injection:  LA dose divided between Right and Left block        Post Assessment  Injection Assessment: negative aspiration for heme, incremental injection and no paresthesia on injection  Patient Tolerance:comfortable throughout block  Complications:no  Additional Notes    Subcostal TAPs    A high-frequency linear transducer, with sterile cover, was placed sub-xiphoid to identify Line[place] Alba, right and left Rectus Abdominus Muscles (DESTINY). The transducer was moved either right or left subcostally to identify the DESTINY and the Transverse Abdominus Muscle (LUX). The insertion site was prepped in sterile fashion and then localized with 2-5 ml of 1% Lidocaine. Using ultrasound-guidance, a 20-gauge B-Hough 4\" Ultraplex 360 non-stimulating echogenic needle was " advanced in plane, from medial to lateral, until the tip of the needle was in the fascial plane between the DESTINY and LUX. 1-3ml of preservative free normal saline was used to hydro-dissect the fascial planes. After the fascial plane was verified, the local anesthetic (LA) was injected. The procedure was repeated on the opposite side for bilateral coverage. Aspiration every 5 ml to prevent intravascular injection. Injection was completed with negative aspiration of blood and negative intravascular injection. Injection pressures were normal with minimal resistance. The subcostal approach to the TAP nerve block ideally anesthetizes the intercostal nerves T6-T9.     Performed by: Lisa Jones CRNA

## 2024-10-24 NOTE — ANESTHESIA PREPROCEDURE EVALUATION
Anesthesia Evaluation                  Airway   Mallampati: I  TM distance: >3 FB  Neck ROM: full  No difficulty expected  Dental      Pulmonary    (+) ,shortness of breath  Cardiovascular     ECG reviewed    (+) hypertension, FORD    ROS comment: Low risk stress, echo acceptable    Neuro/Psych  (+) headaches, tremors, psychiatric history  GI/Hepatic/Renal/Endo    (+) morbid obesity, PUD, renal disease-, thyroid problem     Musculoskeletal     (+) back pain  Abdominal    Substance History      OB/GYN          Other                    Anesthesia Plan    ASA 3     general     (taps)  intravenous induction     Anesthetic plan, risks, benefits, and alternatives have been provided, discussed and informed consent has been obtained with: patient.    Plan discussed with CRNA.    CODE STATUS:

## 2024-10-24 NOTE — OP NOTE
Preoperative Diagnosis:   Morbid Obesity (220 pounds, 99.8 kg, BMI 35) with Multiple Co-Morbidities    Postoperative Diagnosis:   Same    Procedure:                                                    Laparoscopic Sleeve Gastrectomy (85% subtotal vertical gastrectomy, Titan with Seamguard reinforcement [id number]    Laparoscopic hiatal hernia repair (not paraesophageal) with falciform ligament reinforcement                                                                        Esophagogastroduodenoscopy                                                                      Surgeon:                                                       NIC Patel MD    Anesthesia:                                                   GETA    EBL:                                                              50 cc    Fluids:                                                           Crystalloid    Specimens:                                                   Subtotal gastrectomy    Drains:                                                           #10 TYSON    Counts:                                                          Correct    Complications:                                               None    Findings:  Posterior fat/lipoma in the hiatus widening the hiatus and creating a hiatal hernia. Lipoma reduced and hiatal hernia repair performed posteriorly with falciform ligament reinforcement. Extensive omental adhesions to the liver and gallbladder precluding visualization of the gallbladder. Adhesions of the first and proximal 2nd portion of the duodenum to the undersurface of the right medial liver. On intraoperative endoscopy, narrowing noted at the angularis corresponding externally to the anterior side of the sleeve staple line at the angularis. This was addressed by performing an anterior seromyotomy in this area.     Indications:   This is a disabled 49 year-old morbidly obese male who presents for elective laparoscopic sleeve  gastrectomy.  He and his wife confirmed in preop that he has been nicotine and ulcerogenic agent free for the last 2 weeks and will remain so for 6 weeks postoperatively.  He has undergone our extensive preoperative education teaching and consent process everything is in order and he wishes to proceed.      Operative Technique:     The patient was brought to the operating room, and placed supine upon the operating room table.  SCD hose were placed, he underwent uneventful general endotracheal anesthesia per the anesthesiology staff, he received IV Ancef, and subcutaneous Lovenox, the anesthesiology staff performed a TAP block, and his abdomen was prepped and draped with ChloraPrep in a sterile fashion, an Ioban was used as well, a James catheter was not placed.    The peritoneal cavity was entered in the left upper quadrant using a 5 mm trocar utilizing an OptiView technique and the abdomen was insufflated to a pressure of 15 mmHg with CO2 gas. Exploratory laparoscopy revealed no evidence of injury from the entrance technique, mildly enlarged smooth fatty appearing liver, and extensive omental adhesions to the liver covering the gallbladder and precluding visualization of the gallbladder.    Remaining trocars were placed under direct visualization including 5 mm trocars in the right, mid, and left lateral abdomen and after infiltration of the peritoneum with local anesthetic under direct visualization a 19 mm trocar was placed to the right of the umbilicus off the midline through the right medial rectus sheath.    Through a stab incision in the epigastrium a Stephanie retractor was used to elevate the left lobe of the liver.  There was no visible hiatal hernia from the anterior view.  Beginning approximately two thirds of the way around the greater curvature of the stomach, the gastrocolic vessels were divided using the Enseal device.  Excess oozing noted and 1 g TXA IV given in a new Enseal device obtained.  This  proceeded proximally taking down all the short gastric vessels and exposing the left yumiko.  There was clearly a weakness noted on the posterolateral view, photodocumentation obtained.  The hernia sac was incised along the base of the left yumiko and a posterior hiatal lipoma encountered and again photodocumented. The pars flaccida was divided above and below the hepatic branches of the vagus nerve which were preserved, there was not a replaced hepatic vessel.  The hernia sac was incised along the base of the right yumiko and this was extended up and across the phrenoesophageal membrane.  The hernia sac and lipoma were reduced to below the level of the crura.  The GE junction was lengthened to well below the level the crura by dissecting areolar tissue well into the mediastinum.  A latex free drain was used temporarily for esophageal retraction.  The anterior posterior vagus nerves were preserved.  The crura were dissected to the meeting point inferiorly.  Approximately 3 cm of intra-abdominal esophagus was obtained.  The large anterior fat pad and associated hernia sac was elevated to expose the angle of Hiss.  The hiatal hernia repair was deferred until after subtotal sleeve gastrectomy.    1 mg glucagon IV given.  Gastrocolic vessels were then divided medially to a few centimeters proximal to the pylorus.  In order to fully mobilize the antrum for subtotal sleeve gastrectomy the first and proximal second portion of the duodenum were freed up by lysing adhesions to the undersurface of the right medial liver.  Adhesions of the posterior stomach to the pancreas and retroperitoneum were divided.  The stomach was marked with a Kitner saturated with a marking pen 1 cm lateral to the angle of His, 3 cm from the angularis, and 6 cm from the pylorus. A 38 Macanese balloon bougie was advanced into the distal antrum and the balloon insufflated with 60 mL of air.    The Titan stapler with Seamguard reinforcement was positioned  along the markings with the calibration balloon centered at the marking at the angularis and the stapler was closed.  The calibration bougie was desufflated and removed.  The 85% subtotal vertical sleeve gastrectomy was then performed with a single firing using the Titan stapler BE5B6.  The Titan stapler was removed.  The subtotal gastrectomy specimen was retrieved through the 19 mm trocar site incision, inspected, and sent unopened to pathology for permanent section.   It was an average size specimen.  Initially it was placed in formalin and the OR nurse recalled the patient had requested to her that I obtain a picture of the removed sleeve specimen so it was removed from formalin and placed on a separate [place] stand and a picture obtained later near the end of the procedure.       The hiatal hernia repair was then performed posteriorly using a running nonabsorbable  2-0 V-loc suture with good result, photodocumentation obtained before and after repair.  The falciform ligament was noted to have minimal associated fat and was amputated at its base and mobilized up to the level of the liver and passed behind the esophagus in front of the crural repair and sutured as a sling over the angle of Hiss using a running nonabsorbable 2-0 V-Loc suture continuing the suture anteriorly along the GE junction and then suturing medially to the falciform ligament along the lesser curvature.  On inspecting the falciform ligament sling afterwards it appeared the initial V-Loc was intact but loose at its origin and therefore this was tightened by placing a 2-0 silk suture through the falciform and tying it to the V-loc.  Upon completion photodocumentation again obtained.        The sleeve was submerged under saline.  Upper endoscopy was performed, and the endoscope was advanced into the duodenal bulb.  No air bubbles or leak seen, no bleeding at the staple line.  There was moderate narrowing noted at the angularis which on the  laparoscopic view corresponded to the anterior aspect of the sleeve at the angularis.  This was not obstructive and the endoscope could pass without resistance.  The pylorus was patent but in mild spasm, no deformity.  No further prepyloric ulceration noted, no gastritis, no hiatal hernia or Villanueva's esophagus.  No narrowing or angulation from the hiatal hernia repair and falciform ligament reinforcement.  Z-line roughly 44 cm and the endoscope was withdrawn.  Endoscopic photodocumentation obtained of the prepyloric region and the GE junction.  Irrigation fluid was suctioned free.      With the sleeve mildly distended with air from the endoscopy, the narrowing anteriorly at the angularis was now readily apparent from the laparoscopic view as well.  The etiology unclear but possibly related to the fairly extensive duodenal and omental adhesions to the gallbladder and liver possibly not allowing adequate mobility of the antrum despite previous adhesiolysis.  Given my fairly extensive history with dealing with symptomatic partial gastric outlet obstruction symptoms related to sleeve gastrectomies performed by myself and several from outlying surgeons with similar endoscopic findings I felt this anatomy would likely be symptomatic and require surgical intervention.  The risk of conversion to Dayami-en-Y gastric bypass at this time outweighed the benefit especially given his given his history of longstanding nicotine use and ulcerogenic agent exposure.  I entertained the possibility that he may tolerate this anatomy without difficulty but again given the appearance I felt in my extensive experience as above that this would be unlikely.    I felt there was a high risk of him having nausea and vomiting due to partial gastric outlet obstruction in the early postoperative period with the possibility of delayed leak and/or malnutrition and the need for TPN/nasojejunal feedings and future surgical intervention.  Weighing the  risks and benefits, I felt the best course of action would be to perform an anterior seromyotomy as the best option to hopefully avoid the need for future conversion to gastric bypass having had success with this in the past.  I have not had any luck with gastro-gastrostomies and all have required conversion to Dayami-en-Y gastrojejunostomies.  I also have not had long-term success with proximal diverting Dayami-en-Y gastrojejunostomy leaving the sleeve intact and again the vast majority have required conversion to formal Dayami-en-Y gastrojejunostomy anatomy.  I entertained excising the staple line in this area and hand sewing it over a dilator however this almost certainly would result in narrowing of the angularis and again ultimate conversion to Dayami-en-Y gastric bypass.   Once again this was a judgment call based on my extensive experience and using my best judgement and I was well aware of other potential options that would look better in retrospect should he develop complications from this decision.  I felt this was the best time to perform this while the patient was at his optimum health for elective surgery and adequately nourished on the preoperative high-protein diet.     Therefore an anterior seromyotomy was then carefully performed beginning a few centimeters proximal to the angularis and extending it a few centimeters beyond the angularis taking care to avoid a gastrotomy.  This was done with the hook and scissors using sharp dissection and judicious cautery.  This did seem to open up the area significantly and now externally no longer had the appearance of partial gastric outlet obstruction.  I elected not to repeat the upper endoscopy to minimize the risk of perforation of the outpouched mucosa from the seromyotomy.  Moreover, this was the best I would be able to safely do without the need to convert to Dayami-en-Y gastric bypass as I do not feel there were many other surgical options as above.    The sleeve  was resting nicely and hemostatic.  The sleeve staple line and area of mucosal outpouching at the seromyotomy were treated with a total of 4 mL of of aerosolized Tisseel fibrin glue.  The surrounding greater omentum was then sutured over the seromyotomy to the serosa along the lesser curvature using a running absorbable 3-0 Stratafix suture.     Photodocumentation of the sleeve obtained upon completion.  A #10 flat fully perforated TYSON drain was placed under the left lobe of the liver and up over the spleen and brought out through the right sided 5 mm trocar site incision and anchored to the skin with a 2-0 nylon suture.  The Stephanie retractor was removed. 10 mg Barhemsys IV given.  Fascia at the 19 mm trocar site incision was closed with a horizontal mattress 0 Vicryl suture placed with a suture passer under direct visualization and tying the knot extracorporeally.  Remaining trocars were removed under direct visualization, no bleeding noted from their sites.  Skin in each incision was closed using 3-0 Monocryl plus in an interrupted subcuticular stitch followed by skin glue.  A dry sterile dressing was placed around the TYSON site.  Operative findings with the increased possibility of gastric perforation/leak especially at the seromytomy site discussed with the patient's wife verbally over the phone at the end of surgery.  The patient tolerated the procedure well without complication, was taken to the recovery room in stable condition.

## 2024-10-24 NOTE — ANESTHESIA PROCEDURE NOTES
Airway  Urgency: elective    Date/Time: 10/24/2024 12:04 PM  Airway not difficult    General Information and Staff    Patient location during procedure: OR  CRNA/CAA: Lisa Jones CRNA    Indications and Patient Condition  Indications for airway management: airway protection    Preoxygenated: yes  MILS not maintained throughout  Mask difficulty assessment: 1 - vent by mask    Final Airway Details  Final airway type: endotracheal airway      Successful airway: ETT  Cuffed: yes   Successful intubation technique: video laryngoscopy  Facilitating devices/methods: intubating stylet  Endotracheal tube insertion site: oral  Blade: [place]  Blade size: 3  ETT size (mm): 8.0  Cormack-Lehane Classification: grade I - full view of glottis  Placement verified by: chest auscultation and capnometry   Measured from: lips  ETT/EBT  to lips (cm): 23  Number of attempts at approach: 1  Assessment: lips, teeth, and gum same as pre-op and atraumatic intubation    Additional Comments  Negative epigastric sounds, Breath sound equal bilaterally with symmetric chest rise and fall

## 2024-10-25 ENCOUNTER — APPOINTMENT (OUTPATIENT)
Dept: GENERAL RADIOLOGY | Facility: HOSPITAL | Age: 49
End: 2024-10-25
Payer: MEDICARE

## 2024-10-25 VITALS
DIASTOLIC BLOOD PRESSURE: 93 MMHG | SYSTOLIC BLOOD PRESSURE: 141 MMHG | RESPIRATION RATE: 18 BRPM | BODY MASS INDEX: 34.53 KG/M2 | OXYGEN SATURATION: 97 % | WEIGHT: 220 LBS | HEART RATE: 75 BPM | HEIGHT: 67 IN | TEMPERATURE: 98.1 F

## 2024-10-25 LAB
ALBUMIN SERPL-MCNC: 4.6 G/DL (ref 3.5–5.2)
ALBUMIN/GLOB SERPL: 1.8 G/DL
ALP SERPL-CCNC: 44 U/L (ref 39–117)
ALT SERPL W P-5'-P-CCNC: 64 U/L (ref 1–41)
ANION GAP SERPL CALCULATED.3IONS-SCNC: 14 MMOL/L (ref 5–15)
AST SERPL-CCNC: 53 U/L (ref 1–40)
BASOPHILS # BLD AUTO: 0.01 10*3/MM3 (ref 0–0.2)
BASOPHILS NFR BLD AUTO: 0.1 % (ref 0–1.5)
BILIRUB SERPL-MCNC: 0.4 MG/DL (ref 0–1.2)
BUN SERPL-MCNC: 18 MG/DL (ref 6–20)
BUN/CREAT SERPL: 22.2 (ref 7–25)
CALCIUM SPEC-SCNC: 9.2 MG/DL (ref 8.6–10.5)
CHLORIDE SERPL-SCNC: 101 MMOL/L (ref 98–107)
CO2 SERPL-SCNC: 26 MMOL/L (ref 22–29)
CREAT SERPL-MCNC: 0.81 MG/DL (ref 0.76–1.27)
DEPRECATED RDW RBC AUTO: 40.6 FL (ref 37–54)
EGFRCR SERPLBLD CKD-EPI 2021: 109.4 ML/MIN/1.73
EOSINOPHIL # BLD AUTO: 0.02 10*3/MM3 (ref 0–0.4)
EOSINOPHIL NFR BLD AUTO: 0.2 % (ref 0.3–6.2)
ERYTHROCYTE [DISTWIDTH] IN BLOOD BY AUTOMATED COUNT: 13.2 % (ref 12.3–15.4)
GLOBULIN UR ELPH-MCNC: 2.6 GM/DL
GLUCOSE SERPL-MCNC: 102 MG/DL (ref 65–99)
HCT VFR BLD AUTO: 34.3 % (ref 37.5–51)
HGB BLD-MCNC: 12.4 G/DL (ref 13–17.7)
IMM GRANULOCYTES # BLD AUTO: 0.08 10*3/MM3 (ref 0–0.05)
IMM GRANULOCYTES NFR BLD AUTO: 0.7 % (ref 0–0.5)
IRON 24H UR-MRATE: 53 MCG/DL (ref 59–158)
LYMPHOCYTES # BLD AUTO: 0.88 10*3/MM3 (ref 0.7–3.1)
LYMPHOCYTES NFR BLD AUTO: 8.1 % (ref 19.6–45.3)
MCH RBC QN AUTO: 31.3 PG (ref 26.6–33)
MCHC RBC AUTO-ENTMCNC: 36.2 G/DL (ref 31.5–35.7)
MCV RBC AUTO: 86.6 FL (ref 79–97)
MONOCYTES # BLD AUTO: 0.64 10*3/MM3 (ref 0.1–0.9)
MONOCYTES NFR BLD AUTO: 5.9 % (ref 5–12)
NEUTROPHILS NFR BLD AUTO: 85 % (ref 42.7–76)
NEUTROPHILS NFR BLD AUTO: 9.18 10*3/MM3 (ref 1.7–7)
NRBC BLD AUTO-RTO: 0 /100 WBC (ref 0–0.2)
PLATELET # BLD AUTO: 159 10*3/MM3 (ref 140–450)
PMV BLD AUTO: 10.7 FL (ref 6–12)
POTASSIUM SERPL-SCNC: 3.4 MMOL/L (ref 3.5–5.2)
POTASSIUM SERPL-SCNC: 3.8 MMOL/L (ref 3.5–5.2)
PROT SERPL-MCNC: 7.2 G/DL (ref 6–8.5)
RBC # BLD AUTO: 3.96 10*6/MM3 (ref 4.14–5.8)
SODIUM SERPL-SCNC: 141 MMOL/L (ref 136–145)
WBC NRBC COR # BLD AUTO: 10.81 10*3/MM3 (ref 3.4–10.8)

## 2024-10-25 PROCEDURE — 84132 ASSAY OF SERUM POTASSIUM: CPT | Performed by: SURGERY

## 2024-10-25 PROCEDURE — 25010000002 ENOXAPARIN PER 10 MG: Performed by: SURGERY

## 2024-10-25 PROCEDURE — 74240 X-RAY XM UPR GI TRC 1CNTRST: CPT

## 2024-10-25 PROCEDURE — 25010000002 ONDANSETRON PER 1 MG: Performed by: SURGERY

## 2024-10-25 PROCEDURE — 25810000003 SODIUM CHLORIDE 0.9 % SOLUTION 1,000 ML FLEX CONT: Performed by: SURGERY

## 2024-10-25 PROCEDURE — 25810000003 LACTATED RINGERS PER 1000 ML: Performed by: SURGERY

## 2024-10-25 PROCEDURE — 80053 COMPREHEN METABOLIC PANEL: CPT | Performed by: SURGERY

## 2024-10-25 PROCEDURE — 85025 COMPLETE CBC W/AUTO DIFF WBC: CPT | Performed by: SURGERY

## 2024-10-25 PROCEDURE — 25010000002 CEFAZOLIN PER 500 MG: Performed by: SURGERY

## 2024-10-25 PROCEDURE — 25010000002 HYDRALAZINE PER 20 MG: Performed by: SURGERY

## 2024-10-25 PROCEDURE — 63710000001 PROMETHAZINE PER 12.5 MG: Performed by: SURGERY

## 2024-10-25 PROCEDURE — 99024 POSTOP FOLLOW-UP VISIT: CPT | Performed by: SURGERY

## 2024-10-25 PROCEDURE — 25010000002 POTASSIUM CHLORIDE PER 2 MEQ: Performed by: SURGERY

## 2024-10-25 PROCEDURE — 83540 ASSAY OF IRON: CPT | Performed by: SURGERY

## 2024-10-25 PROCEDURE — 0 DIATRIZOATE MEGLUMINE & SODIUM PER 1 ML: Performed by: SURGERY

## 2024-10-25 PROCEDURE — 25010000002 THIAMINE PER 100 MG: Performed by: SURGERY

## 2024-10-25 PROCEDURE — 25010000002 CYANOCOBALAMIN PER 1000 MCG: Performed by: SURGERY

## 2024-10-25 RX ORDER — PROMETHAZINE HYDROCHLORIDE 12.5 MG/1
12.5 TABLET ORAL EVERY 6 HOURS PRN
Qty: 10 TABLET | Refills: 0 | Status: SHIPPED | OUTPATIENT
Start: 2024-10-25

## 2024-10-25 RX ORDER — HYDROMORPHONE HYDROCHLORIDE 2 MG/1
2 TABLET ORAL EVERY 4 HOURS PRN
Qty: 10 TABLET | Refills: 0 | Status: SHIPPED | OUTPATIENT
Start: 2024-10-25

## 2024-10-25 RX ORDER — OMEPRAZOLE 40 MG/1
40 CAPSULE, DELAYED RELEASE ORAL DAILY
Qty: 60 CAPSULE | Refills: 0 | Status: SHIPPED | OUTPATIENT
Start: 2024-10-25 | End: 2024-12-24

## 2024-10-25 RX ORDER — DIATRIZOATE MEGLUMINE AND DIATRIZOATE SODIUM 660; 100 MG/ML; MG/ML
30 SOLUTION ORAL; RECTAL
Status: COMPLETED | OUTPATIENT
Start: 2024-10-25 | End: 2024-10-25

## 2024-10-25 RX ORDER — POTASSIUM CHLORIDE 1500 MG/1
40 TABLET, EXTENDED RELEASE ORAL EVERY 4 HOURS
Status: COMPLETED | OUTPATIENT
Start: 2024-10-25 | End: 2024-10-25

## 2024-10-25 RX ADMIN — HYDROMORPHONE HYDROCHLORIDE 2 MG: 2 TABLET ORAL at 04:11

## 2024-10-25 RX ADMIN — PROMETHAZINE HYDROCHLORIDE 12.5 MG: 12.5 TABLET ORAL at 08:43

## 2024-10-25 RX ADMIN — ONDANSETRON 4 MG: 2 INJECTION INTRAMUSCULAR; INTRAVENOUS at 04:46

## 2024-10-25 RX ADMIN — OXYCODONE HYDROCHLORIDE 5 MG: 5 TABLET ORAL at 14:19

## 2024-10-25 RX ADMIN — SODIUM CHLORIDE 2000 MG: 900 INJECTION INTRAVENOUS at 03:59

## 2024-10-25 RX ADMIN — POTASSIUM CHLORIDE AND SODIUM CHLORIDE 125 ML/HR: 450; 150 INJECTION, SOLUTION INTRAVENOUS at 12:19

## 2024-10-25 RX ADMIN — ONDANSETRON 4 MG: 2 INJECTION INTRAMUSCULAR; INTRAVENOUS at 15:20

## 2024-10-25 RX ADMIN — AMLODIPINE BESYLATE 5 MG: 5 TABLET ORAL at 17:39

## 2024-10-25 RX ADMIN — FOLIC ACID 200 ML/HR: 5 INJECTION, SOLUTION INTRAMUSCULAR; INTRAVENOUS; SUBCUTANEOUS at 04:00

## 2024-10-25 RX ADMIN — LEVOTHYROXINE SODIUM 50 MCG: 0.05 TABLET ORAL at 05:49

## 2024-10-25 RX ADMIN — CYANOCOBALAMIN 1000 MCG: 1000 INJECTION, SOLUTION INTRAMUSCULAR at 08:39

## 2024-10-25 RX ADMIN — PROMETHAZINE HYDROCHLORIDE 12.5 MG: 12.5 TABLET ORAL at 17:39

## 2024-10-25 RX ADMIN — ACETAMINOPHEN 1000 MG: 500 TABLET ORAL at 12:33

## 2024-10-25 RX ADMIN — SODIUM CHLORIDE, SODIUM LACTATE, POTASSIUM CHLORIDE, CALCIUM CHLORIDE 150 ML/HR: 20; 30; 600; 310 INJECTION, SOLUTION INTRAVENOUS at 02:56

## 2024-10-25 RX ADMIN — PANTOPRAZOLE SODIUM 40 MG: 40 TABLET, DELAYED RELEASE ORAL at 08:31

## 2024-10-25 RX ADMIN — ENOXAPARIN SODIUM 40 MG: 100 INJECTION SUBCUTANEOUS at 08:34

## 2024-10-25 RX ADMIN — POTASSIUM CHLORIDE 40 MEQ: 1500 TABLET, EXTENDED RELEASE ORAL at 12:33

## 2024-10-25 RX ADMIN — HYDRALAZINE HYDROCHLORIDE 10 MG: 20 INJECTION INTRAMUSCULAR; INTRAVENOUS at 03:59

## 2024-10-25 RX ADMIN — ACETAMINOPHEN 1000 MG: 500 TABLET ORAL at 05:49

## 2024-10-25 RX ADMIN — DIATRIZOATE MEGLUMINE AND DIATRIZOATE SODIUM 30 ML: 660; 100 SOLUTION ORAL; RECTAL at 09:39

## 2024-10-25 RX ADMIN — VALSARTAN 80 MG: 80 TABLET, FILM COATED ORAL at 08:35

## 2024-10-25 RX ADMIN — SERTRALINE 100 MG: 100 TABLET, FILM COATED ORAL at 08:35

## 2024-10-25 RX ADMIN — POTASSIUM CHLORIDE 40 MEQ: 1500 TABLET, EXTENDED RELEASE ORAL at 08:46

## 2024-10-25 RX ADMIN — LABETALOL HYDROCHLORIDE 400 MG: 200 TABLET, FILM COATED ORAL at 08:32

## 2024-10-25 RX ADMIN — SIMETHICONE 80 MG: 80 TABLET, CHEWABLE ORAL at 15:45

## 2024-10-25 RX ADMIN — SIMETHICONE 80 MG: 80 TABLET, CHEWABLE ORAL at 04:38

## 2024-10-25 RX ADMIN — GABAPENTIN 100 MG: 100 CAPSULE ORAL at 08:38

## 2024-10-25 RX ADMIN — ALLOPURINOL 300 MG: 300 TABLET ORAL at 08:35

## 2024-10-25 NOTE — CASE MANAGEMENT/SOCIAL WORK
Discharge Planning Assessment  [facility]     Patient Name: Crow Watson  MRN: [id number]  Today's Date: 10/25/2024    Admit Date: 10/24/2024    Plan: home   Discharge Needs Assessment       Row Name 10/25/24 0917       Living Environment    People in Home spouse    Current Living Arrangements home    Primary Care Provided by self       Transition Planning    Patient/Family Anticipates Transition to home with family       Discharge Needs Assessment    Readmission Within the Last 30 Days no previous admission in last 30 days    Equipment Currently Used at Home none    Concerns to be Addressed basic needs;discharge planning                   Discharge Plan       Row Name 10/25/24 0917       Plan    Plan home    Patient/Family in Agreement with Plan yes    Plan Comments I met with this patient and his wife bedside. [facility] live in [facility]. He is independent with activities of daily living and mobility. He anticipates returning home after this hospitalization, and his wife can transport. Case management will follow.    Final Discharge Disposition Code 01 - home or self-care                  Continued Care and Services - Admitted Since 10/24/2024    No active coordination exists for this encounter.       Expected Discharge Date and Time       Expected Discharge Date Expected Discharge Time    Oct 30, 2024            Demographic Summary       Row Name 10/25/24 0916       General Information    General Information Comments I confirmed that Riccardo Alissoncornelius is Mr Watson's PCp and he has Medicare AB and D                   Functional Status       Row Name 10/25/24 0917       Functional Status, IADL    Medications independent    Meal Preparation independent    Housekeeping independent    Laundry independent    Shopping independent                   Psychosocial    No documentation.                  Abuse/Neglect    No documentation.                  Legal    No documentation.                  Substance Abuse    No  documentation.                  Patient Forms    No documentation.                     Laura Parker RN

## 2024-10-25 NOTE — PLAN OF CARE
Problem: Adult Inpatient Plan of Care  Goal: Plan of Care Review  Outcome: Progressing  Flowsheets (Taken 10/25/2024 0421)  Progress: improving  Plan of Care Reviewed With: patient  Goal: Patient-Specific Goal (Individualized)  Outcome: Progressing  Goal: Absence of Hospital-Acquired Illness or Injury  Outcome: Progressing  Intervention: Identify and Manage Fall Risk  Recent Flowsheet Documentation  Taken 10/25/2024 0200 by Ese Charles RN  Safety Promotion/Fall Prevention:   activity supervised   assistive device/personal items within reach   clutter free environment maintained   lighting adjusted   nonskid shoes/slippers when out of bed   room organization consistent   safety round/check completed  Taken 10/25/2024 0000 by Ese Charles RN  Safety Promotion/Fall Prevention:   activity supervised   assistive device/personal items within reach   clutter free environment maintained   lighting adjusted   nonskid shoes/slippers when out of bed   room organization consistent   safety round/check completed  Taken 10/24/2024 2218 by Ese Charles RN  Safety Promotion/Fall Prevention:   activity supervised   assistive device/personal items within reach   clutter free environment maintained   lighting adjusted   nonskid shoes/slippers when out of bed   room organization consistent   safety round/check completed  Taken 10/24/2024 2000 by Ese Charles RN  Safety Promotion/Fall Prevention:   activity supervised   assistive device/personal items within reach   clutter free environment maintained   lighting adjusted   nonskid shoes/slippers when out of bed   room organization consistent   safety round/check completed  Intervention: Prevent Skin Injury  Recent Flowsheet Documentation  Taken 10/25/2024 0200 by Ese Charles RN  Body Position: position changed independently  Taken 10/25/2024 0000 by Ese Charles RN  Body Position: position changed independently  Taken 10/24/2024 2218 by Ese Charles RN  Body Position:  position changed independently  Taken 10/24/2024 2000 by Ese Charles RN  Body Position: position changed independently  Intervention: Prevent and Manage VTE (Venous Thromboembolism) Risk  Recent Flowsheet Documentation  Taken 10/25/2024 0200 by Ese Charles RN  VTE Prevention/Management:   bilateral   SCDs (sequential compression devices) on  Taken 10/25/2024 0000 by Ese Charles RN  VTE Prevention/Management:   bilateral   SCDs (sequential compression devices) off  Taken 10/24/2024 2218 by Ese Charles RN  VTE Prevention/Management:   bilateral   SCDs (sequential compression devices) on  Taken 10/24/2024 2000 by Ese Charles RN  VTE Prevention/Management:   bilateral   SCDs (sequential compression devices) on  Intervention: Prevent Infection  Recent Flowsheet Documentation  Taken 10/25/2024 0200 by Ese Charles RN  Infection Prevention: environmental surveillance performed  Taken 10/25/2024 0000 by Ese Charles RN  Infection Prevention: environmental surveillance performed  Taken 10/24/2024 2218 by Ese Charles RN  Infection Prevention: environmental surveillance performed  Taken 10/24/2024 2000 by Ese Charles RN  Infection Prevention: environmental surveillance performed  Goal: Optimal Comfort and Wellbeing  Outcome: Progressing  Intervention: Monitor Pain and Promote Comfort  Recent Flowsheet Documentation  Taken 10/24/2024 2300 by Ese Charles RN  Pain Management Interventions:   position adjusted   pillow support provided  Taken 10/24/2024 2218 by Ese Charles RN  Pain Management Interventions: pain medication given  Intervention: Provide Person-Centered Care  Recent Flowsheet Documentation  Taken 10/24/2024 2000 by Ese Charles RN  Trust Relationship/Rapport:   care explained   choices provided  Goal: Readiness for Transition of Care  Outcome: Progressing   Goal Outcome Evaluation:  Plan of Care Reviewed With: patient        Progress: improving     -VSS, RA  -Pt ambulating frequently in  hallway  -IS encouraged  -PRN pain medication given for abdominal and gas discomfort   -PRN BP medication given  -TYSNO drain in place  -No further complaints at this time

## 2024-10-25 NOTE — PROGRESS NOTES
"Bariatric Surgery     LOS: 1 day   Patient Care Team:  Kreis, Samuel Duane, MD as PCP - General (Family Medicine)  Magdalene Varma PA as Physician Assistant (Bariatrics)    Chief Complaint:  POD #1    Subjective     Interval History:  Doing great.  No complaints.  Tolerating PO. Denies N/V.  No fevers.  Pain controlled.  Ambulating frequently in the halls, so much so that the patient reports the nurses have to trupti him to give him his medicines.  Voiding.  IS 2000 2500.  Eager to go home.    Objective     Vital Signs  Blood pressure (P) 141/93, pulse 75, temperature 98.1 °F (36.7 °C), temperature source Oral, resp. rate 18, height 168.9 cm (66.5\"), weight 99.8 kg (220 lb), SpO2 97%.    Physical Exam:  General: Alert, NAD  Abdomen: soft, appropriate, incisions okay.  Bruising at the 19 mm port site.  TYSON drain is serosanguineous.  Extremities: warm, (+) SCDs     Results Review:     I reviewed the patient's new clinical results.  I reviewed the patient's new imaging results and agree with the interpretation.    Labs:  Lab Results (last 24 hours)       Procedure Component Value Units Date/Time    Tissue Pathology Exam [[id number]] Collected: 10/24/24 1302    Specimen: Tissue from Stomach Updated: 10/25/24 0638    Comprehensive Metabolic Panel [[id number]]  (Abnormal) Collected: 10/25/24 0330    Specimen: Blood Updated: 10/25/24 0524     Glucose 102 mg/dL      BUN 18 mg/dL      Creatinine 0.81 mg/dL      Sodium 141 mmol/L      Potassium 3.4 mmol/L      Chloride 101 mmol/L      CO2 26.0 mmol/L      Calcium 9.2 mg/dL      Total Protein 7.2 g/dL      Albumin 4.6 g/dL      ALT (SGPT) 64 U/L      AST (SGOT) 53 U/L      Alkaline Phosphatase 44 U/L      Total Bilirubin 0.4 mg/dL      Globulin 2.6 gm/dL      Comment: Calculated Result        A/G Ratio 1.8 g/dL      BUN/Creatinine Ratio 22.2     Anion Gap 14.0 mmol/L      eGFR 109.4 mL/min/1.73     Narrative:      GFR Normal >60  Chronic Kidney Disease <60  Kidney Failure <15      " Iron [[id number]]  (Abnormal) Collected: 10/25/24 0330    Specimen: Blood Updated: 10/25/24 0512     Iron 53 mcg/dL     CBC & Differential [[id number]]  (Abnormal) Collected: 10/25/24 0330    Specimen: Blood Updated: 10/25/24 0457    Narrative:      The following orders were created for panel order CBC & Differential.  Procedure                               Abnormality         Status                     ---------                               -----------         ------                     CBC Auto Differential[[id number]]        Abnormal            Final result                 Please view results for these tests on the individual orders.    CBC Auto Differential [[id number]]  (Abnormal) Collected: 10/25/24 0330    Specimen: Blood Updated: 10/25/24 0457     WBC 10.81 10*3/mm3      RBC 3.96 10*6/mm3      Hemoglobin 12.4 g/dL      Hematocrit 34.3 %      MCV 86.6 fL      MCH 31.3 pg      MCHC 36.2 g/dL      RDW 13.2 %      RDW-SD 40.6 fl      MPV 10.7 fL      Platelets 159 10*3/mm3      Neutrophil % 85.0 %      Lymphocyte % 8.1 %      Monocyte % 5.9 %      Eosinophil % 0.2 %      Basophil % 0.1 %      Immature Grans % 0.7 %      Neutrophils, Absolute 9.18 10*3/mm3      Lymphocytes, Absolute 0.88 10*3/mm3      Monocytes, Absolute 0.64 10*3/mm3      Eosinophils, Absolute 0.02 10*3/mm3      Basophils, Absolute 0.01 10*3/mm3      Immature Grans, Absolute 0.08 10*3/mm3      nRBC 0.0 /100 WBC             Imaging:  Imaging Results (Last 24 Hours)       Procedure Component Value Units Date/Time    FL Upper GI Single Contrast With KUB [[id number]] Resulted: 10/25/24 0923     Updated: 10/25/24 0939              Assessment & Plan     POD # 1 s/p LSG/HHR with myotomy and TYSON drain placement.    Doing well.  UGI normal post-op, images reviewed.  Specifically, no evidence of leak at incisura myotomy site.  Report is pending.    Patient feels well and has met discharge criteria.  Will discharge home with follow up in 1 week with PA.  Rx  given for Dilaudid, Phenergan, and PPI.  Please hold HCTZ.  Patient asked about CBD Gummies that he takes at night for sleep, and I recommended that he take diphenhydramine instead, as there can be a risk of hyperemesis with CBD products.   Discharge instructions reviewed with patient and all questions answered.      Keep TYSON drain.  Pt and wife are instructed to record daily drain outputs and drain character and bring to 1 week appointment.             Buffy Villalobos MD  10/25/24  16:26 EDT

## 2024-10-31 ENCOUNTER — OFFICE VISIT (OUTPATIENT)
Dept: BARIATRICS/WEIGHT MGMT | Facility: CLINIC | Age: 49
End: 2024-10-31
Payer: MEDICARE

## 2024-10-31 VITALS
WEIGHT: 208.4 LBS | DIASTOLIC BLOOD PRESSURE: 90 MMHG | BODY MASS INDEX: 32.71 KG/M2 | RESPIRATION RATE: 18 BRPM | HEART RATE: 103 BPM | SYSTOLIC BLOOD PRESSURE: 124 MMHG | OXYGEN SATURATION: 98 % | HEIGHT: 67 IN | TEMPERATURE: 98.4 F

## 2024-10-31 DIAGNOSIS — R73.03 PREDIABETES: ICD-10-CM

## 2024-10-31 DIAGNOSIS — I10 PRIMARY HYPERTENSION: ICD-10-CM

## 2024-10-31 DIAGNOSIS — F41.9 ANXIETY: ICD-10-CM

## 2024-10-31 DIAGNOSIS — E03.9 HYPOTHYROIDISM, UNSPECIFIED TYPE: ICD-10-CM

## 2024-10-31 DIAGNOSIS — E66.811 OBESITY, CLASS I, BMI 30-34.9: Primary | ICD-10-CM

## 2024-10-31 DIAGNOSIS — E78.2 MIXED HYPERLIPIDEMIA: ICD-10-CM

## 2024-10-31 DIAGNOSIS — M54.9 BACK PAIN, UNSPECIFIED BACK LOCATION, UNSPECIFIED BACK PAIN LATERALITY, UNSPECIFIED CHRONICITY: ICD-10-CM

## 2024-10-31 PROCEDURE — 1160F RVW MEDS BY RX/DR IN RCRD: CPT | Performed by: SURGERY

## 2024-10-31 PROCEDURE — 99024 POSTOP FOLLOW-UP VISIT: CPT | Performed by: SURGERY

## 2024-10-31 PROCEDURE — 1159F MED LIST DOCD IN RCRD: CPT | Performed by: SURGERY

## 2024-10-31 PROCEDURE — 3080F DIAST BP >= 90 MM HG: CPT | Performed by: SURGERY

## 2024-10-31 PROCEDURE — 3074F SYST BP LT 130 MM HG: CPT | Performed by: SURGERY

## 2024-10-31 RX ORDER — URSODIOL 300 MG/1
300 CAPSULE ORAL 2 TIMES DAILY
Qty: 60 CAPSULE | Refills: 5 | Status: SHIPPED | OUTPATIENT
Start: 2024-10-31 | End: 2025-04-29

## 2024-10-31 NOTE — PROGRESS NOTES
[facility] Bariatric Surgery  [address]  [address]  [facility] [address] [phone]      Patient Name:  Crow Watson.  :  1975      Date of Visit: 10/31/2024      Reason for Visit:  POD #7    HPI:  Crow Watson is a 49 y.o. male s/p 10/24/24 LSG/HHR with falciform ligament reinforcement.  Incisura tight, myotomy at time of surgery and TYSON left.    <30 cc/day TYSON, ss.  NO unfavorable color change.    Doing well.  No issues/concerns. Denies dysphagia, reflux, nausea, vomiting, and abdominal pain.  Tolerating diet progression - on stage 1.  Getting 75-100g prot/day.  Drinking 64 fluid oz/day.  Taking MVI.  On Omeprazole .  Holding ASA , NSAIDs , and Steroids.  Ambulating.     Constipation?: no       Presurgery weight: 220 pounds.  Today's weight is 94.5 kg (208 lb 6.4 oz) pounds, today's  Body mass index is 33.13 kg/m²., and weight loss since surgery is 12 pounds.       Path reviewed with patient:  Final Diagnosis   STOMACH, SUBTOTAL GASTRECTOMY:                Gastric tissue with no signficant histopathologic change.  Negative for intestinal metaplasia and dysplasia.  No Helicobacter pylori-like organisms identified on routine stains.       Past Medical History:   Diagnosis Date   • Abnormal PFTs     Moderate restriction, decreased diffusion 2024   • Anxiety    • Atrophy, cortical     white matter disease   • Back pain     Naproxen. No steroids   • Current every day nicotine vaping    • FORD (dyspnea on exertion)    • Former smoker    • Gastric ulcer due to chemical     Prepyloric large NSAID and aspirin induced ulcer EGD Dr. Patel May 2024   • Gout     allopurinol   • Heartburn     Omeprazole daily.  EGD Dr. Patel May 2024   • Herniated disc, cervical    • Hyperlipidemia    • Hypertension    • Hypothyroidism     synthroid   • Kidney stone    • Lumbar herniated disc    • Memory loss    • Migraine    • Neuropathy    • Prediabetes    • Thrombocytopenia    • Tremor    • Wears  glasses      Past Surgical History:   Procedure Laterality Date   • COLONOSCOPY  2002   • ENDOSCOPY N/A 10/24/2024    Procedure: ESOPHAGOGASTRODUODENOSCOPY;  Surgeon: Ron Patel MD;  Location:  [facility];  Service: Bariatric;  Laterality: N/A;  scope ID: [id number]   • GASTRIC SLEEVE LAPAROSCOPIC N/A 10/24/2024    Procedure: GASTRIC SLEEVE LAPAROSCOPIC;  Surgeon: Ron Patel MD;  Location:  [facility];  Service: Bariatric;  Laterality: N/A;  hiatal hernia time: 9366-4873; resumed 4120-6888   • HIATAL HERNIA REPAIR N/A 10/24/2024    Procedure: HIATAL HERNIA REPAIR LAPAROSCOPIC;  Surgeon: Ron Patel MD;  Location:  [facility];  Service: Bariatric;  Laterality: N/A;   • SHOULDER ARTHROSCOPY Right     tendon repair   • SINUS SURGERY  2014   • VASECTOMY      2000     Outpatient Medications Marked as Taking for the 10/31/24 encounter (Office Visit) with Buffy Villalobos MD   Medication Sig Dispense Refill   • allopurinol (ZYLOPRIM) 300 MG tablet Take 1 tablet by mouth 2 (Two) Times a Day. bid     • amitriptyline (ELAVIL) 50 MG tablet Take 1 tablet by mouth Every Night.  1   • amLODIPine (NORVASC) 5 MG tablet 1 tablet Daily.  5   • labetalol (NORMODYNE) 200 MG tablet Take 2 tablets by mouth 2 (Two) Times a Day.     • levothyroxine (SYNTHROID, LEVOTHROID) 50 MCG tablet Take 1 tablet by mouth Every Morning.     • omeprazole (priLOSEC) 40 MG capsule Take 1 capsule by mouth Daily for 60 days. 60 capsule 0   • promethazine (PHENERGAN) 12.5 MG tablet Take 1 tablet by mouth Every 6 (Six) Hours As Needed for Nausea or Vomiting. 10 tablet 0   • QUEtiapine (SEROquel) 200 MG tablet Take 1 tablet by mouth Every Night.     • sertraline (ZOLOFT) 100 MG tablet Take 1 tablet by mouth 2 (Two) Times a Day. tid     • traZODone (DESYREL) 100 MG tablet 2 at bedtime     • valsartan (DIOVAN) 80 MG tablet Take 1 tablet by mouth Daily.       Allergies   Allergen Reactions   • Morphine And Codeine Other (See Comments) and  "Headache     Migraine, hypertension   • Ultram [Tramadol Hcl] Nausea And Vomiting and Headache   • Zithromax [Azithromycin] Nausea And Vomiting       Social History     Socioeconomic History   • Marital status:    Tobacco Use   • Smoking status: Former     Current packs/day: 0.00     Average packs/day: 1 pack/day for 30.0 years (30.0 ttl pk-yrs)     Types: Cigarettes     Start date:      Quit date: 2019     Years since quittin.8     Passive exposure: Past   • Smokeless tobacco: Never   Vaping Use   • Vaping status: Former   Substance and Sexual Activity   • Alcohol use: Never   • Drug use: Never     Comment: CBD gummies   • Sexual activity: Defer       /90   Pulse 103   Temp 98.4 °F (36.9 °C) (Temporal)   Resp 18   Ht 168.9 cm (66.5\")   Wt 94.5 kg (208 lb 6.4 oz)   SpO2 98%   BMI 33.13 kg/m²   Physical Exam  Constitutional:       General: He is not in acute distress.     Appearance: He is well-developed. He is not diaphoretic.   HENT:      Head: Normocephalic and atraumatic.      Mouth/Throat:      Pharynx: No oropharyngeal exudate.   Eyes:      Conjunctiva/sclera: Conjunctivae normal.      Pupils: Pupils are equal, round, and reactive to light.   Pulmonary:      Effort: Pulmonary effort is normal. No respiratory distress.   Abdominal:      General: There is no distension.      Palpations: Abdomen is soft.      Comments: Incisions are well-healing without induration, erythema, fluctuance, or drainage.    TYSON ss.  Mild tape rash.   Skin:     General: Skin is warm and dry.      Coloration: Skin is not pale.   Neurological:      Mental Status: He is alert and oriented to person, place, and time.      Cranial Nerves: No cranial nerve deficit.   Psychiatric:         Behavior: Behavior normal.         Thought Content: Thought content normal.         Assessment:   POD # 7             ICD-10-CM ICD-9-CM   1. Obesity, Class I, BMI 30-34.9  E66.811 278.00   2. Hypothyroidism, unspecified type  E03.9 " 244.9   3. Mixed hyperlipidemia  E78.2 272.2   4. Primary hypertension  I10 401.9   5. Prediabetes  R73.03 790.29   6. Anxiety  F41.9 300.00   7. Back pain, unspecified back location, unspecified back pain laterality, unspecified chronicity  M54.9 724.5       Plan:  Doing well. Continue to advance diet per manual.  Increase protein intake to 100g/day.  Actigall Rx given.  Increase exercise/activity as tolerated.  Reviewed lifting restrictions, nothing >25 lbs x 2 more weeks.  Continue vitamins.  Continue PPI.  Continue to avoid ASA/NSAIDs/Steroids x 6 weeks postop.  Call w/ problems/concerns.    The patient was instructed to follow up in 3 weeks, sooner if needed.     Buffy Villalobos MD  Answers submitted by the patient for this visit:  Other (Submitted on 10/29/2024)  Please describe your symptoms.: Post op visit  Have you had these symptoms before?: No  How long have you been having these symptoms?: 5-7 days  Primary Reason for Visit (Submitted on 10/29/2024)  What is the primary reason for your visit?: Problem Not Listed

## 2024-11-26 ENCOUNTER — OFFICE VISIT (OUTPATIENT)
Dept: BARIATRICS/WEIGHT MGMT | Facility: CLINIC | Age: 49
End: 2024-11-26
Payer: MEDICARE

## 2024-11-26 VITALS
HEART RATE: 87 BPM | HEIGHT: 67 IN | DIASTOLIC BLOOD PRESSURE: 86 MMHG | SYSTOLIC BLOOD PRESSURE: 130 MMHG | WEIGHT: 180.8 LBS | RESPIRATION RATE: 18 BRPM | TEMPERATURE: 97.3 F | BODY MASS INDEX: 28.38 KG/M2 | OXYGEN SATURATION: 97 %

## 2024-11-26 DIAGNOSIS — R19.05 PERIUMBILICAL MASS: ICD-10-CM

## 2024-11-26 DIAGNOSIS — K91.2 POSTGASTRECTOMY MALABSORPTION: Primary | ICD-10-CM

## 2024-11-26 DIAGNOSIS — Z90.3 POSTGASTRECTOMY MALABSORPTION: Primary | ICD-10-CM

## 2024-11-26 DIAGNOSIS — M54.9 BACK PAIN, UNSPECIFIED BACK LOCATION, UNSPECIFIED BACK PAIN LATERALITY, UNSPECIFIED CHRONICITY: ICD-10-CM

## 2024-11-26 DIAGNOSIS — R12 HEARTBURN: ICD-10-CM

## 2024-11-26 DIAGNOSIS — I10 PRIMARY HYPERTENSION: ICD-10-CM

## 2024-11-26 DIAGNOSIS — R73.03 PREDIABETES: ICD-10-CM

## 2024-11-26 DIAGNOSIS — R53.83 FATIGUE, UNSPECIFIED TYPE: ICD-10-CM

## 2024-11-26 PROBLEM — E66.9 OBESITY (BMI 35.0-39.9 WITHOUT COMORBIDITY): Status: RESOLVED | Noted: 2024-05-05 | Resolved: 2024-11-26

## 2024-11-26 PROCEDURE — 1159F MED LIST DOCD IN RCRD: CPT | Performed by: SURGERY

## 2024-11-26 PROCEDURE — 1160F RVW MEDS BY RX/DR IN RCRD: CPT | Performed by: SURGERY

## 2024-11-26 PROCEDURE — 3079F DIAST BP 80-89 MM HG: CPT | Performed by: SURGERY

## 2024-11-26 PROCEDURE — 3075F SYST BP GE 130 - 139MM HG: CPT | Performed by: SURGERY

## 2024-11-26 NOTE — LETTER
"2024     Samuel Duane Kreis, MD  [address] [place] [address]    Patient: Crow Watson   YOB: 1975   Date of Visit: 2024     Dear Samuel Duane Kreis, MD:       Thank you for referring Crow Watson to me for evaluation. Below are the relevant portions of my assessment and plan of care.    If you have questions, please do not hesitate to call me. I look forward to following Crow along with you.         Sincerely,        Ron Patel MD        CC: No Recipients    Ron Patel MD  24 1107  Sign when Signing Visit  [facility] Bariatric Surgery  [address]  [address]  [facility] [address] [phone]      Patient Name:  Crow Watson.  :  1975      Date of Visit: 2024      Reason for Visit:  1 month postop    HPI:  Crow Watson is a 49 y.o. male s/p laparoscopic sleeve gastrectomy, hiatal hernia repair with falciform ligament reinforcement Dr. Patel 10/24/2024    He comes in today with his wife.  Overall he is very pleased with his weight loss and his wife says \"he has been so good\".  He developed hives and itching on the Actigall and his PCP appropriately discontinued it.  He denies any gallbladder symptoms, we went over potential gallbladder symptoms.  He checked noted all the boxes except fatigue.  He is using all the recommended vitamins in the form of patches.  He continues to avoid nicotine and ulcerogenic agents and he knows to do so for another couple weeks with regards to NSAIDs and nicotine in another month with regards to steroids.  He is ambulating and voiding well.  Getting in at least 75 g of protein a day.  Is concerned about a knot at his 19 mm periumbilical trocar site incision.  He says he was not able to see a picture of his stomach and I opened the images of his stomach, laparoscopic images, and endoscopic images in the media tab in epic and he took a picture on his phone of his resected stomach " image.  His med rec reviewed and updated.  He continues on daily PPI.     Presurgery weight: 220 pounds. Today's weight is 82 kg (180 lb 12.8 oz) pounds, today's Body mass index is 28.74 kg/m²., and   weight loss since surgery is 40 pounds.       Past Medical History:   Diagnosis Date   • Abnormal PFTs     Moderate restriction, decreased diffusion April 2024   • Anxiety    • Atrophy, cortical     white matter disease   • Back pain     Naproxen. No steroids   • Current every day nicotine vaping    • FORD (dyspnea on exertion)    • Former smoker    • Gastric ulcer due to chemical     Prepyloric large NSAID and aspirin induced ulcer EGD Dr. Patel May 2024   • Gout     allopurinol   • Heartburn     Omeprazole daily.  EGD Dr. Patel May 2024   • Herniated disc, cervical    • Hyperlipidemia    • Hypertension    • Hypothyroidism     synthroid   • Kidney stone    • Lumbar herniated disc    • Memory loss    • Migraine    • Neuropathy    • Prediabetes    • Thrombocytopenia    • Tremor    • Wears glasses      Past Surgical History:   Procedure Laterality Date   • COLONOSCOPY  2002   • ENDOSCOPY N/A 10/24/2024    Procedure: ESOPHAGOGASTRODUODENOSCOPY;  Surgeon: Ron Patel MD;  Location:  [facility];  Service: Bariatric;  Laterality: N/A;  scope ID: [id number]   • GASTRIC SLEEVE LAPAROSCOPIC N/A 10/24/2024    Procedure: GASTRIC SLEEVE LAPAROSCOPIC;  Surgeon: Ron Patel MD;  Location:  [facility];  Service: Bariatric;  Laterality: N/A;  hiatal hernia time: 5251-9247; resumed 2227-2310   • HIATAL HERNIA REPAIR N/A 10/24/2024    Procedure: HIATAL HERNIA REPAIR LAPAROSCOPIC;  Surgeon: Ron Patel MD;  Location:  [facility];  Service: Bariatric;  Laterality: N/A;   • SHOULDER ARTHROSCOPY Right     tendon repair   • SINUS SURGERY  2014   • VASECTOMY      2000     Outpatient Medications Marked as Taking for the 11/26/24 encounter (Office Visit) with Ron Patel MD   Medication Sig Dispense Refill   • allopurinol  "(ZYLOPRIM) 300 MG tablet Take 1 tablet by mouth 2 (Two) Times a Day. bid     • amitriptyline (ELAVIL) 50 MG tablet Take 1 tablet by mouth Every Night.  1   • amLODIPine (NORVASC) 5 MG tablet 1 tablet Daily.  5   • labetalol (NORMODYNE) 200 MG tablet Take 2 tablets by mouth 2 (Two) Times a Day.     • levothyroxine (SYNTHROID, LEVOTHROID) 50 MCG tablet Take 1 tablet by mouth Every Morning.     • omeprazole (priLOSEC) 40 MG capsule Take 1 capsule by mouth Daily for 60 days. 60 capsule 0   • promethazine (PHENERGAN) 12.5 MG tablet Take 1 tablet by mouth Every 6 (Six) Hours As Needed for Nausea or Vomiting. 10 tablet 0   • QUEtiapine (SEROquel) 200 MG tablet Take 1 tablet by mouth Every Night.     • sertraline (ZOLOFT) 100 MG tablet Take 1 tablet by mouth 2 (Two) Times a Day. tid     • traZODone (DESYREL) 100 MG tablet 2 at bedtime     • valsartan (DIOVAN) 80 MG tablet Take 1 tablet by mouth Daily.       Allergies   Allergen Reactions   • Ursodiol Hives and Itching   • Morphine And Codeine Other (See Comments) and Headache     Migraine, hypertension   • Ultram [Tramadol Hcl] Nausea And Vomiting and Headache   • Zithromax [Azithromycin] Nausea And Vomiting       Social History     Socioeconomic History   • Marital status:    Tobacco Use   • Smoking status: Former     Current packs/day: 0.00     Average packs/day: 1 pack/day for 30.0 years (30.0 ttl pk-yrs)     Types: Cigarettes     Start date:      Quit date: 2019     Years since quittin.9     Passive exposure: Past   • Smokeless tobacco: Never   Vaping Use   • Vaping status: Former   Substance and Sexual Activity   • Alcohol use: Never   • Drug use: Never     Comment: CBD gummies   • Sexual activity: Defer       /86 (BP Location: Left arm, Patient Position: Sitting)   Pulse 87   Temp 97.3 °F (36.3 °C)   Resp 18   Ht 168.9 cm (66.5\")   Wt 82 kg (180 lb 12.8 oz)   SpO2 97%   BMI 28.74 kg/m²     Physical Exam  He is in no apparent distress.  " Appears well-nourished and well-hydrated.  Abdomen soft and benign, cannot exclude an incisional hernia at his 19 mm periumbilical incision with Valsalva maneuvers.    Assessment:  1 month s/p laparoscopic sleeve gastrectomy, hiatal hernia repair with falciform ligament reinforcement Dr. Patel 10/24/2024.    Overall doing very well clinically.  40 pound weight loss in 1 month with initial BMI of 35, now 28.7.  Possible incisional hernia at his periumbilical 19 mm trocar site incision      Plan:    Message sent to Aiyana NUNEZ PA-C to obtain CT scan without contrast with thin cuts through the umbilical region to rule out incisional hernia.  Continue to advance diet per manual.  Continue protein >70g/day.  Encouraged good food choices - high protein, avoidance of corn syrup.    Routine bariatric labs ordered.  Continue vitamins w/ adjustments pending lab results.  Call w/ problems/concerns.    The patient was instructed to follow up in 2 months, sooner if needed.     Thank you Dr. Gutierrez for the opportunity to participate in the care of Mr. Watson.    Ron Patel MD    Answers submitted by the patient for this visit:  Primary Reason for Visit (Submitted on 11/19/2024)  What is the primary reason for your visit?: Problem Not Listed  Problem not listed (Submitted on 11/19/2024)  Chief Complaint: Other medical problem  Reason for appointment: One month post op  abdominal pain: No  anorexia: No  joint pain: No  change in stool: No  chest pain: No  chills: No  nasal congestion: No  cough: No  diaphoresis: No  fatigue: No  fever: No  headaches: No  joint swelling: No  myalgias: No  nausea: No  neck pain: No  numbness: No  rash: No  sore throat: No  swollen glands: No  dysuria: No  vertigo: No  visual change: No  vomiting: No  weakness: No  Onset: 1 to 6 months  Chronicity: recurrent

## 2024-11-26 NOTE — PROGRESS NOTES
"[facility] Bariatric Surgery  [address]  [address]  [facility] [address] [phone]      Patient Name:  Crow Watson.  :  1975      Date of Visit: 2024      Reason for Visit:  1 month postop    HPI:  Crow Watson is a 49 y.o. male s/p laparoscopic sleeve gastrectomy, hiatal hernia repair with falciform ligament reinforcement Dr. Patel 10/24/2024    He comes in today with his wife.  Overall he is very pleased with his weight loss and his wife says \"he has been so good\".  He developed hives and itching on the Actigall and his PCP appropriately discontinued it.  He denies any gallbladder symptoms, we went over potential gallbladder symptoms.  He checked noted all the boxes except fatigue.  He is using all the recommended vitamins in the form of patches.  He continues to avoid nicotine and ulcerogenic agents and he knows to do so for another couple weeks with regards to NSAIDs and nicotine in another month with regards to steroids.  He is ambulating and voiding well.  Getting in at least 75 g of protein a day.  Is concerned about a knot at his 19 mm periumbilical trocar site incision.  He says he was not able to see a picture of his stomach and I opened the images of his stomach, laparoscopic images, and endoscopic images in the media tab in epic and he took a picture on his phone of his resected stomach image.  His med rec reviewed and updated.  He continues on daily PPI.     Presurgery weight: 220 pounds. Today's weight is 82 kg (180 lb 12.8 oz) pounds, today's Body mass index is 28.74 kg/m²., and   weight loss since surgery is 40 pounds.       Past Medical History:   Diagnosis Date    Abnormal PFTs     Moderate restriction, decreased diffusion 2024    Anxiety     Atrophy, cortical     white matter disease    Back pain     Naproxen. 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 Patel May 2024    Gout     allopurinol    Heartburn     Omeprazole daily.  EGD Dr. Patel May 2024    Herniated disc, cervical     Hyperlipidemia     Hypertension     Hypothyroidism     synthroid    Kidney stone     Lumbar herniated disc     Memory loss     Migraine     Neuropathy     Prediabetes     Thrombocytopenia     Tremor     Wears glasses      Past Surgical History:   Procedure Laterality Date    COLONOSCOPY  2002    ENDOSCOPY N/A 10/24/2024    Procedure: ESOPHAGOGASTRODUODENOSCOPY;  Surgeon: Ron Patel MD;  Location:  [facility];  Service: Bariatric;  Laterality: N/A;  scope ID: [id number]    GASTRIC SLEEVE LAPAROSCOPIC N/A 10/24/2024    Procedure: GASTRIC SLEEVE LAPAROSCOPIC;  Surgeon: Ron Patel MD;  Location:  [facility];  Service: Bariatric;  Laterality: N/A;  hiatal hernia time: 5037-2067; resumed [id number]    HIATAL HERNIA REPAIR N/A 10/24/2024    Procedure: HIATAL HERNIA REPAIR LAPAROSCOPIC;  Surgeon: Ron Patel MD;  Location:  [facility];  Service: Bariatric;  Laterality: N/A;    SHOULDER ARTHROSCOPY Right     tendon repair    SINUS SURGERY  2014    VASECTOMY      2000     Outpatient Medications Marked as Taking for the 11/26/24 encounter (Office Visit) with Ron Patel MD   Medication Sig Dispense Refill    allopurinol (ZYLOPRIM) 300 MG tablet Take 1 tablet by mouth 2 (Two) Times a Day. bid      amitriptyline (ELAVIL) 50 MG tablet Take 1 tablet by mouth Every Night.  1    amLODIPine (NORVASC) 5 MG tablet 1 tablet Daily.  5    labetalol (NORMODYNE) 200 MG tablet Take 2 tablets by mouth 2 (Two) Times a Day.      levothyroxine (SYNTHROID, LEVOTHROID) 50 MCG tablet Take 1 tablet by mouth Every Morning.      omeprazole (priLOSEC) 40 MG capsule Take 1 capsule by mouth Daily for 60 days. 60 capsule 0    promethazine (PHENERGAN) 12.5 MG tablet Take 1 tablet by mouth Every 6 (Six) Hours As Needed for Nausea or Vomiting. 10  "tablet 0    QUEtiapine (SEROquel) 200 MG tablet Take 1 tablet by mouth Every Night.      sertraline (ZOLOFT) 100 MG tablet Take 1 tablet by mouth 2 (Two) Times a Day. tid      traZODone (DESYREL) 100 MG tablet 2 at bedtime      valsartan (DIOVAN) 80 MG tablet Take 1 tablet by mouth Daily.       Allergies   Allergen Reactions    Ursodiol Hives and Itching    Morphine And Codeine Other (See Comments) and Headache     Migraine, hypertension    Ultram [Tramadol Hcl] Nausea And Vomiting and Headache    Zithromax [Azithromycin] Nausea And Vomiting       Social History     Socioeconomic History    Marital status:    Tobacco Use    Smoking status: Former     Current packs/day: 0.00     Average packs/day: 1 pack/day for 30.0 years (30.0 ttl pk-yrs)     Types: Cigarettes     Start date:      Quit date: 2019     Years since quittin.9     Passive exposure: Past    Smokeless tobacco: Never   Vaping Use    Vaping status: Former   Substance and Sexual Activity    Alcohol use: Never    Drug use: Never     Comment: CBD gummies    Sexual activity: Defer       /86 (BP Location: Left arm, Patient Position: Sitting)   Pulse 87   Temp 97.3 °F (36.3 °C)   Resp 18   Ht 168.9 cm (66.5\")   Wt 82 kg (180 lb 12.8 oz)   SpO2 97%   BMI 28.74 kg/m²     Physical Exam  He is in no apparent distress.  Appears well-nourished and well-hydrated.  Abdomen soft and benign, cannot exclude an incisional hernia at his 19 mm periumbilical incision with Valsalva maneuvers.    Assessment:  1 month s/p laparoscopic sleeve gastrectomy, hiatal hernia repair with falciform ligament reinforcement Dr. Patel 10/24/2024.    Overall doing very well clinically.  40 pound weight loss in 1 month with initial BMI of 35, now 28.7.  Possible incisional hernia at his periumbilical 19 mm trocar site incision      Plan:    Message sent to Aiyana NUNEZ PA-C to obtain CT scan without contrast with thin cuts through the umbilical region to rule out incisional " hernia.  Continue to advance diet per manual.  Continue protein >70g/day.  Encouraged good food choices - high protein, avoidance of corn syrup.    Routine bariatric labs ordered.  Continue vitamins w/ adjustments pending lab results.  Call w/ problems/concerns.    The patient was instructed to follow up in 2 months, sooner if needed.     Thank you Dr. Gutierrez for the opportunity to participate in the care of Mr. Watson.    Ron Patel MD    Answers submitted by the patient for this visit:  Primary Reason for Visit (Submitted on 11/19/2024)  What is the primary reason for your visit?: Problem Not Listed  Problem not listed (Submitted on 11/19/2024)  Chief Complaint: Other medical problem  Reason for appointment: One month post op  abdominal pain: No  anorexia: No  joint pain: No  change in stool: No  chest pain: No  chills: No  nasal congestion: No  cough: No  diaphoresis: No  fatigue: No  fever: No  headaches: No  joint swelling: No  myalgias: No  nausea: No  neck pain: No  numbness: No  rash: No  sore throat: No  swollen glands: No  dysuria: No  vertigo: No  visual change: No  vomiting: No  weakness: No  Onset: 1 to 6 months  Chronicity: recurrent

## 2024-12-02 LAB
25(OH)D3+25(OH)D2 SERPL-MCNC: 39.4 NG/ML (ref 30–100)
ALBUMIN SERPL-MCNC: 4.5 G/DL (ref 4.1–5.1)
ALP SERPL-CCNC: 72 IU/L (ref 44–121)
ALT SERPL-CCNC: 24 IU/L (ref 0–44)
AST SERPL-CCNC: 23 IU/L (ref 0–40)
BASOPHILS # BLD AUTO: 0 X10E3/UL (ref 0–0.2)
BASOPHILS NFR BLD AUTO: 1 %
BILIRUB SERPL-MCNC: 0.3 MG/DL (ref 0–1.2)
BUN SERPL-MCNC: 20 MG/DL (ref 6–24)
BUN/CREAT SERPL: 21 (ref 9–20)
CALCIUM SERPL-MCNC: 9.9 MG/DL (ref 8.7–10.2)
CHLORIDE SERPL-SCNC: 101 MMOL/L (ref 96–106)
CO2 SERPL-SCNC: 24 MMOL/L (ref 20–29)
CREAT SERPL-MCNC: 0.95 MG/DL (ref 0.76–1.27)
EGFRCR SERPLBLD CKD-EPI 2021: 99 ML/MIN/1.73
EOSINOPHIL # BLD AUTO: 0.5 X10E3/UL (ref 0–0.4)
EOSINOPHIL NFR BLD AUTO: 7 %
ERYTHROCYTE [DISTWIDTH] IN BLOOD BY AUTOMATED COUNT: 13.2 % (ref 11.6–15.4)
FERRITIN SERPL-MCNC: 253 NG/ML (ref 30–400)
FOLATE SERPL-MCNC: 10.5 NG/ML
GLOBULIN SER CALC-MCNC: 2.9 G/DL (ref 1.5–4.5)
GLUCOSE SERPL-MCNC: 92 MG/DL (ref 70–99)
HCT VFR BLD AUTO: 39.3 % (ref 37.5–51)
HGB BLD-MCNC: 12.9 G/DL (ref 13–17.7)
IMM GRANULOCYTES # BLD AUTO: 0 X10E3/UL (ref 0–0.1)
IMM GRANULOCYTES NFR BLD AUTO: 0 %
IRON SERPL-MCNC: 53 UG/DL (ref 38–169)
LYMPHOCYTES # BLD AUTO: 1.2 X10E3/UL (ref 0.7–3.1)
LYMPHOCYTES NFR BLD AUTO: 15 %
MCH RBC QN AUTO: 28.9 PG (ref 26.6–33)
MCHC RBC AUTO-ENTMCNC: 32.8 G/DL (ref 31.5–35.7)
MCV RBC AUTO: 88 FL (ref 79–97)
METHYLMALONATE SERPL-SCNC: 106 NMOL/L (ref 0–378)
MONOCYTES # BLD AUTO: 0.6 X10E3/UL (ref 0.1–0.9)
MONOCYTES NFR BLD AUTO: 9 %
NEUTROPHILS # BLD AUTO: 5.1 X10E3/UL (ref 1.4–7)
NEUTROPHILS NFR BLD AUTO: 68 %
PLATELET # BLD AUTO: 226 X10E3/UL (ref 150–450)
POTASSIUM SERPL-SCNC: 4.9 MMOL/L (ref 3.5–5.2)
PREALB SERPL-MCNC: 25 MG/DL (ref 12–34)
PROT SERPL-MCNC: 7.4 G/DL (ref 6–8.5)
RBC # BLD AUTO: 4.46 X10E6/UL (ref 4.14–5.8)
SODIUM SERPL-SCNC: 140 MMOL/L (ref 134–144)
VIT B1 BLD-SCNC: 133.6 NMOL/L (ref 66.5–200)
WBC # BLD AUTO: 7.5 X10E3/UL (ref 3.4–10.8)

## 2024-12-18 ENCOUNTER — HOSPITAL ENCOUNTER (OUTPATIENT)
Facility: HOSPITAL | Age: 49
Discharge: HOME | End: 2024-12-18
Admitting: PHYSICIAN ASSISTANT
Payer: MEDICARE

## 2024-12-18 DIAGNOSIS — R19.05 PERIUMBILICAL MASS: ICD-10-CM

## 2024-12-18 PROCEDURE — 74150 CT ABDOMEN W/O CONTRAST: CPT

## 2024-12-23 ENCOUNTER — TELEPHONE (OUTPATIENT)
Dept: BARIATRICS/WEIGHT MGMT | Facility: CLINIC | Age: 49
End: 2024-12-23
Payer: MEDICARE

## 2024-12-23 NOTE — TELEPHONE ENCOUNTER
----- Message from Ron Patel sent at 12/23/2024  4:04 PM EST -----    I reviewed the images and the report.  I agree there is not an obvious incisional hernia.  Please reassure the patient and tell him we will keep an eye on it over time.  Thanks!    ----- Message -----  From: Aiyana Matute PA  Sent: 12/23/2024   3:00 PM EST  To: Ron Patel MD    For your review - ordered to r/o incisional hernia.  Thanks!

## 2024-12-26 NOTE — TELEPHONE ENCOUNTER
Patient called and informed there was no obvious hernia on the CT scan. Patient verbally understood.

## 2025-02-28 ENCOUNTER — OFFICE VISIT (OUTPATIENT)
Dept: BARIATRICS/WEIGHT MGMT | Facility: CLINIC | Age: 50
End: 2025-02-28
Payer: MEDICARE

## 2025-02-28 VITALS
RESPIRATION RATE: 18 BRPM | BODY MASS INDEX: 25.71 KG/M2 | HEART RATE: 78 BPM | TEMPERATURE: 98.2 F | WEIGHT: 163.8 LBS | OXYGEN SATURATION: 98 % | HEIGHT: 67 IN | SYSTOLIC BLOOD PRESSURE: 126 MMHG | DIASTOLIC BLOOD PRESSURE: 84 MMHG

## 2025-02-28 DIAGNOSIS — R53.83 FATIGUE, UNSPECIFIED TYPE: ICD-10-CM

## 2025-02-28 DIAGNOSIS — Z90.3 POSTGASTRECTOMY MALABSORPTION: ICD-10-CM

## 2025-02-28 DIAGNOSIS — E55.9 VITAMIN D DEFICIENCY: ICD-10-CM

## 2025-02-28 DIAGNOSIS — E66.3 OVERWEIGHT WITH BODY MASS INDEX (BMI) OF 26 TO 26.9 IN ADULT: Primary | ICD-10-CM

## 2025-02-28 DIAGNOSIS — R79.0 ABNORMAL BLOOD LEVEL OF IRON: ICD-10-CM

## 2025-02-28 DIAGNOSIS — K91.2 POSTGASTRECTOMY MALABSORPTION: ICD-10-CM

## 2025-02-28 PROCEDURE — 99214 OFFICE O/P EST MOD 30 MIN: CPT | Performed by: NURSE PRACTITIONER

## 2025-02-28 PROCEDURE — 3074F SYST BP LT 130 MM HG: CPT | Performed by: NURSE PRACTITIONER

## 2025-02-28 PROCEDURE — 1160F RVW MEDS BY RX/DR IN RCRD: CPT | Performed by: NURSE PRACTITIONER

## 2025-02-28 PROCEDURE — 1159F MED LIST DOCD IN RCRD: CPT | Performed by: NURSE PRACTITIONER

## 2025-02-28 PROCEDURE — 3079F DIAST BP 80-89 MM HG: CPT | Performed by: NURSE PRACTITIONER

## 2025-02-28 NOTE — PROGRESS NOTES
"[facility] Bariatric Surgery  [address]  [address]  [facility] [address] [phone]      Patient Name:  Crow Watson.  :  1975      Date of Visit: 2025      Reason for Visit:  Almost 4 months postop    HPI:  Crow Watson is a 49 y.o. male s/p LSG/HHR w/ falciform ligament reinforcement 10/24/2024 [facility]     CT Abd w/o contrast    IMPRESSION:  1. No evidence of a ventral hernia.  2. Splenomegaly.  3. Bilateral nephrolithiasis.    24: [facility] \"I reviewed the images and the report. I agree there is not an obvious incisional hernia. Please reassure the patient and tell him we will keep an eye on it over time. Thanks!\"    Doing well.  Denies dysphagia, reflux, nausea, vomiting, abdominal pain, diarrhea, and constipation.  No longer needing PPI. Getting 75-100g prot/day.  Eating 1 meal per day, plus 2 shakes. Eats dinner, occasional snack. Drinking 64 fluid oz/day.  Physical activity: 3-4 days per week.    Labs 24- mildly low hgb (12.9).  mens MVI, plus patch.         Presurgery weight: 220 pounds. Today's weight is 74.3 kg (163 lb 12.8 oz) pounds, today's Body mass index is 26.04 kg/m²., and   weight loss since surgery is 57 pounds.       Past Medical History:   Diagnosis Date    Abnormal PFTs     Moderate restriction, decreased diffusion 2024    Anxiety     Atrophy, cortical     white matter disease    Back pain     Naproxen. 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 Patel May 2024    Gout     allopurinol    Heartburn     Omeprazole daily.  EGD Dr. Patel May 2024    Herniated disc, cervical     Hyperlipidemia     Hypertension     Hypothyroidism     synthroid    Kidney stone     Lumbar herniated disc     Memory loss     Migraine     Neuropathy     Prediabetes     Thrombocytopenia     Tremor     Wears glasses      Past " Surgical History:   Procedure Laterality Date    COLONOSCOPY  2002    ENDOSCOPY N/A 10/24/2024    Procedure: ESOPHAGOGASTRODUODENOSCOPY;  Surgeon: Ron Patel MD;  Location:  [facility];  Service: Bariatric;  Laterality: N/A;  scope ID: [id number]    GASTRIC SLEEVE LAPAROSCOPIC N/A 10/24/2024    Procedure: GASTRIC SLEEVE LAPAROSCOPIC;  Surgeon: Ron Patel MD;  Location:  [facility];  Service: Bariatric;  Laterality: N/A;  hiatal hernia time: [id number]; resumed [id number]    HIATAL HERNIA REPAIR N/A 10/24/2024    Procedure: HIATAL HERNIA REPAIR LAPAROSCOPIC;  Surgeon: Ron Patel MD;  Location:  [facility];  Service: Bariatric;  Laterality: N/A;    SHOULDER ARTHROSCOPY Right     tendon repair    SINUS SURGERY  2014    VASECTOMY      2000     Outpatient Medications Marked as Taking for the 2/28/25 encounter (Office Visit) with Ese Hutton APRN   Medication Sig Dispense Refill    allopurinol (ZYLOPRIM) 300 MG tablet Take 1 tablet by mouth 2 (Two) Times a Day. bid      amitriptyline (ELAVIL) 50 MG tablet Take 1 tablet by mouth Every Night.  1    amLODIPine (NORVASC) 5 MG tablet 1 tablet Daily.  5    labetalol (NORMODYNE) 200 MG tablet Take 2 tablets by mouth 2 (Two) Times a Day.      levothyroxine (SYNTHROID, LEVOTHROID) 50 MCG tablet Take 1 tablet by mouth Every Morning.      QUEtiapine (SEROquel) 200 MG tablet Take 1 tablet by mouth Every Night.      sertraline (ZOLOFT) 100 MG tablet Take 1 tablet by mouth 2 (Two) Times a Day. tid      traZODone (DESYREL) 100 MG tablet 2 at bedtime      valsartan (DIOVAN) 80 MG tablet Take 1 tablet by mouth Daily.       Allergies   Allergen Reactions    Ursodiol Hives and Itching    Morphine And Codeine Other (See Comments) and Headache     Migraine, hypertension    Ultram [Tramadol Hcl] Nausea And Vomiting and Headache    Zithromax [Azithromycin] Nausea And Vomiting       Social History     Socioeconomic History    Marital status:    Tobacco Use    Smoking  "status: Former     Current packs/day: 0.00     Average packs/day: 1 pack/day for 30.0 years (30.0 ttl pk-yrs)     Types: Cigarettes     Start date:      Quit date: 2019     Years since quittin.1     Passive exposure: Past    Smokeless tobacco: Never   Vaping Use    Vaping status: Every Day   Substance and Sexual Activity    Alcohol use: Never    Drug use: Never     Comment: CBD gummies    Sexual activity: Defer       /84 (BP Location: Left arm, Patient Position: Sitting, Cuff Size: Adult)   Pulse 78   Temp 98.2 °F (36.8 °C) (Temporal)   Resp 18   Ht 168.9 cm (66.5\")   Wt 74.3 kg (163 lb 12.8 oz)   SpO2 98%   BMI 26.04 kg/m²     Physical Exam  Vitals reviewed.   Constitutional:       General: He is not in acute distress.  Cardiovascular:      Rate and Rhythm: Normal rate.   Pulmonary:      Effort: Pulmonary effort is normal.   Musculoskeletal:         General: Normal range of motion.   Neurological:      Mental Status: He is alert.   Psychiatric:         Mood and Affect: Mood normal.           Assessment:  almost 4 months s/p LSG/HHR w/ falciform ligament reinforcement 10/24/2024 [facility]   No diagnosis found.     BMI is >= 25 and <30. (Overweight) The following options were offered after discussion;: see plan      Plan:    Message sent to Aiyana NUNEZ PA-C to obtain CT scan without contrast with thin cuts through the umbilical region to rule out incisional hernia.  Continue to advance diet per manual.  Continue protein >70g/day.  Encouraged good food choices - high protein, avoidance of corn syrup.    Routine bariatric labs ordered.  Continue vitamins w/ adjustments pending lab results.  Call w/ problems/concerns.    The patient was instructed to follow up in 3 months, sooner if needed.     I spent 30 minutes caring for Crow on this date of service. This time includes time spent by me in the following activities: preparing for the visit, reviewing tests, obtaining and/or reviewing a separately obtained " history, performing a medically appropriate examination and/or evaluation, counseling and educating the patient/family/caregiver, ordering medications, tests, or procedures, and documenting information in the medical record.       Ese Hutton, APRN

## 2025-03-05 LAB
25(OH)D3+25(OH)D2 SERPL-MCNC: 44.4 NG/ML (ref 30–100)
ALBUMIN SERPL-MCNC: 5.1 G/DL (ref 4.1–5.1)
ALP SERPL-CCNC: 73 IU/L (ref 44–121)
ALT SERPL-CCNC: 26 IU/L (ref 0–44)
AST SERPL-CCNC: 26 IU/L (ref 0–40)
BASOPHILS # BLD AUTO: 0 X10E3/UL (ref 0–0.2)
BASOPHILS NFR BLD AUTO: 1 %
BILIRUB SERPL-MCNC: 0.2 MG/DL (ref 0–1.2)
BUN SERPL-MCNC: 23 MG/DL (ref 6–24)
BUN/CREAT SERPL: 26 (ref 9–20)
CALCIUM SERPL-MCNC: 10.5 MG/DL (ref 8.7–10.2)
CHLORIDE SERPL-SCNC: 101 MMOL/L (ref 96–106)
CO2 SERPL-SCNC: 24 MMOL/L (ref 20–29)
CREAT SERPL-MCNC: 0.89 MG/DL (ref 0.76–1.27)
EGFRCR SERPLBLD CKD-EPI 2021: 106 ML/MIN/1.73
EOSINOPHIL # BLD AUTO: 0.3 X10E3/UL (ref 0–0.4)
EOSINOPHIL NFR BLD AUTO: 4 %
ERYTHROCYTE [DISTWIDTH] IN BLOOD BY AUTOMATED COUNT: 14.7 % (ref 11.6–15.4)
FERRITIN SERPL-MCNC: 111 NG/ML (ref 30–400)
FOLATE SERPL-MCNC: 19.2 NG/ML
GLOBULIN SER CALC-MCNC: 2.6 G/DL (ref 1.5–4.5)
GLUCOSE SERPL-MCNC: 94 MG/DL (ref 70–99)
HCT VFR BLD AUTO: 42.7 % (ref 37.5–51)
HGB BLD-MCNC: 14.6 G/DL (ref 13–17.7)
IMM GRANULOCYTES # BLD AUTO: 0 X10E3/UL (ref 0–0.1)
IMM GRANULOCYTES NFR BLD AUTO: 1 %
IRON SERPL-MCNC: 64 UG/DL (ref 38–169)
LYMPHOCYTES # BLD AUTO: 1.5 X10E3/UL (ref 0.7–3.1)
LYMPHOCYTES NFR BLD AUTO: 18 %
MCH RBC QN AUTO: 30.4 PG (ref 26.6–33)
MCHC RBC AUTO-ENTMCNC: 34.2 G/DL (ref 31.5–35.7)
MCV RBC AUTO: 89 FL (ref 79–97)
METHYLMALONATE SERPL-SCNC: 143 NMOL/L (ref 0–378)
MONOCYTES # BLD AUTO: 0.5 X10E3/UL (ref 0.1–0.9)
MONOCYTES NFR BLD AUTO: 6 %
NEUTROPHILS # BLD AUTO: 5.8 X10E3/UL (ref 1.4–7)
NEUTROPHILS NFR BLD AUTO: 70 %
PLATELET # BLD AUTO: 189 X10E3/UL (ref 150–450)
POTASSIUM SERPL-SCNC: 4.2 MMOL/L (ref 3.5–5.2)
PREALB SERPL-MCNC: 36 MG/DL (ref 12–34)
PROT SERPL-MCNC: 7.7 G/DL (ref 6–8.5)
RBC # BLD AUTO: 4.81 X10E6/UL (ref 4.14–5.8)
SODIUM SERPL-SCNC: 142 MMOL/L (ref 134–144)
VIT B1 BLD-SCNC: 142.8 NMOL/L (ref 66.5–200)
WBC # BLD AUTO: 8.1 X10E3/UL (ref 3.4–10.8)

## (undated) DEVICE — INTENDED USE FOR SURGICAL MARKING ON INTACT SKIN, ALSO PROVIDES A PERMANENT METHOD OF IDENTIFYING OBJECTS IN THE OPERATING ROOM: Brand: WRITESITE® REGULAR TIP SKIN MARKER

## (undated) DEVICE — APPL CHLORAPREP TINTED 26ML TEAL

## (undated) DEVICE — Device

## (undated) DEVICE — LAPAROVUE VISIBILITY SYSTEM LAPAROSCOPIC SOLUTIONS: Brand: LAPAROVUE

## (undated) DEVICE — SEALANT WND FIBRIN TISSEEL PREFIL/SYR/PRIMAFZ 4ML

## (undated) DEVICE — GLV SURG SENSICARE PI MIC PF SZ8.5 LF STRL

## (undated) DEVICE — BALN BOUGIE STD/TPR 38F

## (undated) DEVICE — APL DUPLOSPRAYER MIS 40CM

## (undated) DEVICE — ENDOPATH 5MM ENDOSCOPIC BLUNT TIP DISSECTORS (12 POUCHES CONTAINING 3 DISSECTORS EACH): Brand: ENDOPATH

## (undated) DEVICE — JP PERF DRN SIL FLT 10MM FULL: Brand: CARDINAL HEALTH

## (undated) DEVICE — SHT AIR TRANSFR COMFRT GLIDE LAT 40X80IN

## (undated) DEVICE — JACKSON-PRATT 100CC BULB RESERVOIR: Brand: CARDINAL HEALTH

## (undated) DEVICE — BLANKT WARM UPPR/BDY ARM/OUT 57X196CM

## (undated) DEVICE — LAPAROSCOPIC SMOKE FILTRATION SYSTEM: Brand: PALL LAPAROSHIELD® PLUS LAPAROSCOPIC SMOKE FILTRATION SYSTEM

## (undated) DEVICE — GLV SURG SENSICARE PI MIC PF SZ9 LF STRL

## (undated) DEVICE — HYPODERMIC SAFETY NEEDLE: Brand: MONOJECT

## (undated) DEVICE — GOWN,NON-REINFORCED,SIRUS,SET IN SLV,XXL: Brand: MEDLINE

## (undated) DEVICE — ENDOPATH XCEL UNIVERSAL TROCAR STABLILITY SLEEVES: Brand: ENDOPATH XCEL

## (undated) DEVICE — ENSEAL LAPAROSCOPIC TISSUE SEALER G2 ARTICULATING CURVED JAW FOR USE WITH G2 GENERATOR 5MM DIAMETER 45CM SHAFT LENGTH: Brand: ENSEAL

## (undated) DEVICE — ENDOPATH XCEL BLADELESS TROCARS WITH STABILITY SLEEVES: Brand: ENDOPATH XCEL

## (undated) DEVICE — UNDRPD COMFRT GLD DRYPAD 36X57IN

## (undated) DEVICE — SYR LUERLOK 30CC

## (undated) DEVICE — NEEDLE, QUINCKE 22GX3.5": Brand: MEDLINE INDUSTRIES, INC.

## (undated) DEVICE — PK BARIATRIC 10

## (undated) DEVICE — TROCAR: Brand: KII FIOS FIRST ENTRY

## (undated) DEVICE — [HIGH FLOW INSUFFLATOR,  DO NOT USE IF PACKAGE IS DAMAGED,  KEEP DRY,  KEEP AWAY FROM SUNLIGHT,  PROTECT FROM HEAT AND RADIOACTIVE SOURCES.]: Brand: PNEUMOSURE